# Patient Record
Sex: FEMALE | Race: WHITE | NOT HISPANIC OR LATINO | Employment: OTHER | URBAN - METROPOLITAN AREA
[De-identification: names, ages, dates, MRNs, and addresses within clinical notes are randomized per-mention and may not be internally consistent; named-entity substitution may affect disease eponyms.]

---

## 2017-01-18 ENCOUNTER — HISTORICAL (OUTPATIENT)
Dept: ENDOSCOPY | Facility: HOSPITAL | Age: 37
End: 2017-01-18

## 2017-01-27 ENCOUNTER — HISTORICAL (OUTPATIENT)
Dept: ADMINISTRATIVE | Facility: HOSPITAL | Age: 37
End: 2017-01-27

## 2017-02-15 ENCOUNTER — HISTORICAL (OUTPATIENT)
Dept: ADMINISTRATIVE | Facility: HOSPITAL | Age: 37
End: 2017-02-15

## 2017-03-03 ENCOUNTER — HISTORICAL (OUTPATIENT)
Dept: ADMINISTRATIVE | Facility: HOSPITAL | Age: 37
End: 2017-03-03

## 2017-03-27 ENCOUNTER — HISTORICAL (OUTPATIENT)
Dept: ADMINISTRATIVE | Facility: HOSPITAL | Age: 37
End: 2017-03-27

## 2017-04-27 ENCOUNTER — HISTORICAL (OUTPATIENT)
Dept: ADMINISTRATIVE | Facility: HOSPITAL | Age: 37
End: 2017-04-27

## 2017-05-03 ENCOUNTER — HISTORICAL (OUTPATIENT)
Dept: ADMINISTRATIVE | Facility: HOSPITAL | Age: 37
End: 2017-05-03

## 2017-05-03 LAB — DEPRECATED CALCIDIOL+CALCIFEROL SERPL-MC: 35.8 NG/ML (ref 30–80)

## 2017-06-07 ENCOUNTER — HISTORICAL (OUTPATIENT)
Dept: ADMINISTRATIVE | Facility: HOSPITAL | Age: 37
End: 2017-06-07

## 2017-06-07 LAB
ALBUMIN SERPL-MCNC: 3.5 GM/DL (ref 3.4–5)
ALP SERPL-CCNC: 185 UNIT/L (ref 38–126)
ALT SERPL-CCNC: 101 UNIT/L (ref 12–78)
AST SERPL-CCNC: 44 UNIT/L (ref 15–37)
BILIRUB SERPL-MCNC: 0.4 MG/DL (ref 0.2–1)
BILIRUBIN DIRECT+TOT PNL SERPL-MCNC: 0.1 MG/DL (ref 0–0.5)
BILIRUBIN DIRECT+TOT PNL SERPL-MCNC: 0.3 MG/DL (ref 0–0.8)
LIVER PROFILE INTERP: ABNORMAL
PROT SERPL-MCNC: 6.8 GM/DL (ref 6.4–8.2)

## 2017-07-19 ENCOUNTER — HISTORICAL (OUTPATIENT)
Dept: ADMINISTRATIVE | Facility: HOSPITAL | Age: 37
End: 2017-07-19

## 2017-07-19 LAB
ALBUMIN SERPL-MCNC: 3.5 GM/DL (ref 3.4–5)
ALP SERPL-CCNC: 170 UNIT/L (ref 38–126)
ALT SERPL-CCNC: 73 UNIT/L (ref 12–78)
AST SERPL-CCNC: 39 UNIT/L (ref 15–37)
BILIRUB SERPL-MCNC: 0.2 MG/DL (ref 0.2–1)
BILIRUBIN DIRECT+TOT PNL SERPL-MCNC: 0.1 MG/DL (ref 0–0.5)
BILIRUBIN DIRECT+TOT PNL SERPL-MCNC: 0.1 MG/DL (ref 0–0.8)
LIVER PROFILE INTERP: ABNORMAL
PROT SERPL-MCNC: 6.9 GM/DL (ref 6.4–8.2)

## 2017-08-08 ENCOUNTER — HISTORICAL (OUTPATIENT)
Dept: ADMINISTRATIVE | Facility: HOSPITAL | Age: 37
End: 2017-08-08

## 2017-08-08 LAB
ABS NEUT (OLG): 3.69 X10(3)/MCL (ref 2.1–9.2)
ALBUMIN SERPL-MCNC: 3.5 GM/DL (ref 3.4–5)
ALBUMIN/GLOB SERPL: 1.1 RATIO (ref 1.1–2)
ALP SERPL-CCNC: 185 UNIT/L (ref 38–126)
ALT SERPL-CCNC: 99 UNIT/L (ref 12–78)
AST SERPL-CCNC: 47 UNIT/L (ref 15–37)
BASOPHILS # BLD AUTO: 0.1 X10(3)/MCL (ref 0–0.2)
BASOPHILS NFR BLD AUTO: 1 %
BILIRUB SERPL-MCNC: 0.2 MG/DL (ref 0.2–1)
BILIRUBIN DIRECT+TOT PNL SERPL-MCNC: 0.1 MG/DL (ref 0–0.5)
BILIRUBIN DIRECT+TOT PNL SERPL-MCNC: 0.1 MG/DL (ref 0–0.8)
BUN SERPL-MCNC: 13 MG/DL (ref 7–18)
CALCIUM SERPL-MCNC: 9.4 MG/DL (ref 8.5–10.1)
CHLORIDE SERPL-SCNC: 105 MMOL/L (ref 98–107)
CO2 SERPL-SCNC: 26 MMOL/L (ref 21–32)
CREAT SERPL-MCNC: 0.61 MG/DL (ref 0.55–1.02)
EOSINOPHIL # BLD AUTO: 0.4 X10(3)/MCL (ref 0–0.9)
EOSINOPHIL NFR BLD AUTO: 4 %
ERYTHROCYTE [DISTWIDTH] IN BLOOD BY AUTOMATED COUNT: 13.2 % (ref 11.5–17)
EST. AVERAGE GLUCOSE BLD GHB EST-MCNC: 97 MG/DL
GLOBULIN SER-MCNC: 3.2 GM/DL (ref 2.4–3.5)
GLUCOSE SERPL-MCNC: 99 MG/DL (ref 74–106)
HBA1C MFR BLD: 5 % (ref 4.2–6.3)
HCT VFR BLD AUTO: 39.2 % (ref 37–47)
HGB BLD-MCNC: 12.4 GM/DL (ref 12–16)
LYMPHOCYTES # BLD AUTO: 3.5 X10(3)/MCL (ref 0.6–4.6)
LYMPHOCYTES NFR BLD AUTO: 40 %
MCH RBC QN AUTO: 31.4 PG (ref 27–31)
MCHC RBC AUTO-ENTMCNC: 31.6 GM/DL (ref 33–36)
MCV RBC AUTO: 99.2 FL (ref 80–94)
MONOCYTES # BLD AUTO: 1.1 X10(3)/MCL (ref 0.1–1.3)
MONOCYTES NFR BLD AUTO: 13 %
NEUTROPHILS # BLD AUTO: 3.69 X10(3)/MCL (ref 2.1–9.2)
NEUTROPHILS NFR BLD AUTO: 42 %
PLATELET # BLD AUTO: 324 X10(3)/MCL (ref 130–400)
PMV BLD AUTO: 11.1 FL (ref 9.4–12.4)
POTASSIUM SERPL-SCNC: 4.4 MMOL/L (ref 3.5–5.1)
PROT SERPL-MCNC: 6.7 GM/DL (ref 6.4–8.2)
RBC # BLD AUTO: 3.95 X10(6)/MCL (ref 4.2–5.4)
SODIUM SERPL-SCNC: 142 MMOL/L (ref 136–145)
WBC # SPEC AUTO: 8.7 X10(3)/MCL (ref 4.5–11.5)

## 2017-09-08 ENCOUNTER — HISTORICAL (OUTPATIENT)
Dept: ADMINISTRATIVE | Facility: HOSPITAL | Age: 37
End: 2017-09-08

## 2017-09-08 LAB
ALBUMIN SERPL-MCNC: 3.4 GM/DL (ref 3.4–5)
ALP SERPL-CCNC: 162 UNIT/L (ref 38–126)
ALT SERPL-CCNC: 48 UNIT/L (ref 12–78)
AST SERPL-CCNC: 20 UNIT/L (ref 15–37)
BILIRUB SERPL-MCNC: 0.2 MG/DL (ref 0.2–1)
BILIRUBIN DIRECT+TOT PNL SERPL-MCNC: 0.1 MG/DL (ref 0–0.2)
BILIRUBIN DIRECT+TOT PNL SERPL-MCNC: 0.1 MG/DL (ref 0–0.8)
LIVER PROFILE INTERP: ABNORMAL
PROT SERPL-MCNC: 6.7 GM/DL (ref 6.4–8.2)

## 2018-02-09 ENCOUNTER — HISTORICAL (OUTPATIENT)
Dept: ADMINISTRATIVE | Facility: HOSPITAL | Age: 38
End: 2018-02-09

## 2018-02-09 LAB
ALBUMIN SERPL-MCNC: 3.4 GM/DL (ref 3.4–5)
ALBUMIN/GLOB SERPL: 0.9 {RATIO}
ALP SERPL-CCNC: 147 UNIT/L (ref 38–126)
ALT SERPL-CCNC: 53 UNIT/L (ref 12–78)
AST SERPL-CCNC: 27 UNIT/L (ref 15–37)
BILIRUB SERPL-MCNC: 0.2 MG/DL (ref 0.2–1)
BILIRUBIN DIRECT+TOT PNL SERPL-MCNC: 0.1 MG/DL (ref 0–0.2)
BILIRUBIN DIRECT+TOT PNL SERPL-MCNC: 0.1 MG/DL (ref 0–0.8)
BUN SERPL-MCNC: 19 MG/DL (ref 7–18)
CALCIUM SERPL-MCNC: 8.8 MG/DL (ref 8.5–10.1)
CHLORIDE SERPL-SCNC: 105 MMOL/L (ref 98–107)
CO2 SERPL-SCNC: 29 MMOL/L (ref 21–32)
CREAT SERPL-MCNC: 0.63 MG/DL (ref 0.55–1.02)
ERYTHROCYTE [DISTWIDTH] IN BLOOD BY AUTOMATED COUNT: 13 % (ref 11.5–17)
EST. AVERAGE GLUCOSE BLD GHB EST-MCNC: 80 MG/DL
GLOBULIN SER-MCNC: 3.6 GM/DL (ref 2.4–3.5)
GLUCOSE SERPL-MCNC: 89 MG/DL (ref 74–106)
HBA1C MFR BLD: 4.4 % (ref 4.2–6.3)
HCT VFR BLD AUTO: 36.9 % (ref 37–47)
HGB BLD-MCNC: 12.1 GM/DL (ref 12–16)
MCH RBC QN AUTO: 33.2 PG (ref 27–31)
MCHC RBC AUTO-ENTMCNC: 32.8 GM/DL (ref 33–36)
MCV RBC AUTO: 101.1 FL (ref 80–94)
PLATELET # BLD AUTO: 315 X10(3)/MCL (ref 130–400)
PMV BLD AUTO: 11.2 FL (ref 9.4–12.4)
POTASSIUM SERPL-SCNC: 4.5 MMOL/L (ref 3.5–5.1)
PROT SERPL-MCNC: 7 GM/DL (ref 6.4–8.2)
RBC # BLD AUTO: 3.65 X10(6)/MCL (ref 4.2–5.4)
SODIUM SERPL-SCNC: 141 MMOL/L (ref 136–145)
TSH SERPL-ACNC: 1 MIU/ML (ref 0.36–3.74)
WBC # SPEC AUTO: 7 X10(3)/MCL (ref 4.5–11.5)

## 2018-08-10 ENCOUNTER — HISTORICAL (OUTPATIENT)
Dept: ADMINISTRATIVE | Facility: HOSPITAL | Age: 38
End: 2018-08-10

## 2018-08-10 LAB
ABS NEUT (OLG): 2.63 X10(3)/MCL (ref 2.1–9.2)
ALBUMIN SERPL-MCNC: 3.5 GM/DL (ref 3.4–5)
ALBUMIN/GLOB SERPL: 1 RATIO (ref 1.1–2)
ALP SERPL-CCNC: 122 UNIT/L (ref 38–126)
ALT SERPL-CCNC: 57 UNIT/L (ref 12–78)
AST SERPL-CCNC: 34 UNIT/L (ref 15–37)
BASOPHILS # BLD AUTO: 0.1 X10(3)/MCL (ref 0–0.2)
BASOPHILS NFR BLD AUTO: 1 %
BILIRUB SERPL-MCNC: 0.1 MG/DL (ref 0.2–1)
BILIRUBIN DIRECT+TOT PNL SERPL-MCNC: 0 MG/DL (ref 0–0.5)
BILIRUBIN DIRECT+TOT PNL SERPL-MCNC: 0.1 MG/DL (ref 0–0.8)
BUN SERPL-MCNC: 18 MG/DL (ref 7–18)
CALCIUM SERPL-MCNC: 9.2 MG/DL (ref 8.5–10.1)
CHLORIDE SERPL-SCNC: 105 MMOL/L (ref 98–107)
CHOLEST SERPL-MCNC: 162 MG/DL (ref 0–200)
CHOLEST/HDLC SERPL: 2.4 {RATIO} (ref 0–4)
CO2 SERPL-SCNC: 27 MMOL/L (ref 21–32)
CREAT SERPL-MCNC: 0.55 MG/DL (ref 0.55–1.02)
EOSINOPHIL # BLD AUTO: 0.2 X10(3)/MCL (ref 0–0.9)
EOSINOPHIL NFR BLD AUTO: 3 %
ERYTHROCYTE [DISTWIDTH] IN BLOOD BY AUTOMATED COUNT: 12.9 % (ref 11.5–17)
EST. AVERAGE GLUCOSE BLD GHB EST-MCNC: 97 MG/DL
GLOBULIN SER-MCNC: 3.5 GM/DL (ref 2.4–3.5)
GLUCOSE SERPL-MCNC: 106 MG/DL (ref 74–106)
HBA1C MFR BLD: 5 % (ref 4.2–6.3)
HCT VFR BLD AUTO: 39.7 % (ref 37–47)
HDLC SERPL-MCNC: 68 MG/DL (ref 35–60)
HGB BLD-MCNC: 12.8 GM/DL (ref 12–16)
LDLC SERPL CALC-MCNC: 78 MG/DL (ref 0–129)
LYMPHOCYTES # BLD AUTO: 3.6 X10(3)/MCL (ref 0.6–4.6)
LYMPHOCYTES NFR BLD AUTO: 48 %
MCH RBC QN AUTO: 32.9 PG (ref 27–31)
MCHC RBC AUTO-ENTMCNC: 32.2 GM/DL (ref 33–36)
MCV RBC AUTO: 102.1 FL (ref 80–94)
MONOCYTES # BLD AUTO: 0.9 X10(3)/MCL (ref 0.1–1.3)
MONOCYTES NFR BLD AUTO: 12 %
NEUTROPHILS # BLD AUTO: 2.63 X10(3)/MCL (ref 1.4–7.9)
NEUTROPHILS NFR BLD AUTO: 35 %
PLATELET # BLD AUTO: 348 X10(3)/MCL (ref 130–400)
PMV BLD AUTO: 11.3 FL (ref 9.4–12.4)
POTASSIUM SERPL-SCNC: 4.3 MMOL/L (ref 3.5–5.1)
PROT SERPL-MCNC: 7 GM/DL (ref 6.4–8.2)
RBC # BLD AUTO: 3.89 X10(6)/MCL (ref 4.2–5.4)
SODIUM SERPL-SCNC: 140 MMOL/L (ref 136–145)
TRIGL SERPL-MCNC: 80 MG/DL (ref 30–150)
VLDLC SERPL CALC-MCNC: 16 MG/DL
WBC # SPEC AUTO: 7.5 X10(3)/MCL (ref 4.5–11.5)

## 2021-02-04 ENCOUNTER — HISTORICAL (OUTPATIENT)
Dept: ADMINISTRATIVE | Facility: HOSPITAL | Age: 41
End: 2021-02-04

## 2021-02-04 LAB
ABS NEUT (OLG): 3.46 X10(3)/MCL (ref 2.1–9.2)
ALBUMIN SERPL-MCNC: 3.7 GM/DL (ref 3.5–5)
ALBUMIN/GLOB SERPL: 1.2 RATIO (ref 1.1–2)
ALP SERPL-CCNC: 128 UNIT/L (ref 40–150)
ALT SERPL-CCNC: 37 UNIT/L (ref 0–55)
AST SERPL-CCNC: 22 UNIT/L (ref 5–34)
BASOPHILS # BLD AUTO: 0.07 X10(3)/MCL (ref 0–0.2)
BASOPHILS NFR BLD AUTO: 0.9 % (ref 0–1)
BILIRUB SERPL-MCNC: 0.3 MG/DL (ref 0.2–1.2)
BILIRUBIN DIRECT+TOT PNL SERPL-MCNC: 0.1 MG/DL (ref 0–0.5)
BILIRUBIN DIRECT+TOT PNL SERPL-MCNC: 0.2 MG/DL (ref 0–0.8)
BUN SERPL-MCNC: 15.8 MG/DL (ref 7–18.7)
CALCIUM SERPL-MCNC: 9.3 MG/DL (ref 8.4–10.2)
CHLORIDE SERPL-SCNC: 105 MMOL/L (ref 98–107)
CHOLEST SERPL-MCNC: 173 MG/DL
CHOLEST/HDLC SERPL: 4 {RATIO} (ref 0–5)
CO2 SERPL-SCNC: 26 MMOL/L (ref 22–29)
CREAT SERPL-MCNC: 0.71 MG/DL (ref 0.57–1.11)
EOSINOPHIL # BLD AUTO: 0.17 X10(3)/MCL (ref 0–0.9)
EOSINOPHIL NFR BLD AUTO: 2.1 % (ref 0–6.4)
ERYTHROCYTE [DISTWIDTH] IN BLOOD BY AUTOMATED COUNT: 12.8 % (ref 11.5–17)
EST. AVERAGE GLUCOSE BLD GHB EST-MCNC: <96.8 MG/DL
GLOBULIN SER-MCNC: 3.1 GM/DL (ref 2.4–3.5)
GLUCOSE SERPL-MCNC: 108 MG/DL (ref 74–100)
HBA1C MFR BLD: <5 %
HCT VFR BLD AUTO: 39.7 % (ref 37–47)
HDLC SERPL-MCNC: 45 MG/DL (ref 40–60)
HGB BLD-MCNC: 13 GM/DL (ref 12–16)
IMM GRANULOCYTES # BLD AUTO: 0.02 10*3/UL (ref 0–0.02)
IMM GRANULOCYTES NFR BLD AUTO: 0.3 % (ref 0–0.43)
LDLC SERPL CALC-MCNC: 105 MG/DL (ref 50–140)
LYMPHOCYTES # BLD AUTO: 3.3 X10(3)/MCL (ref 0.6–4.6)
LYMPHOCYTES NFR BLD AUTO: 41.5 % (ref 16–44)
MCH RBC QN AUTO: 31.9 PG (ref 27–31)
MCHC RBC AUTO-ENTMCNC: 32.7 GM/DL (ref 33–36)
MCV RBC AUTO: 97.3 FL (ref 80–94)
MONOCYTES # BLD AUTO: 0.93 X10(3)/MCL (ref 0.1–1.3)
MONOCYTES NFR BLD AUTO: 11.7 % (ref 4–12.1)
NEUTROPHILS # BLD AUTO: 3.46 X10(3)/MCL (ref 2.1–9.2)
NEUTROPHILS NFR BLD AUTO: 43.5 % (ref 43–73)
NRBC BLD AUTO-RTO: 0 % (ref 0–0.2)
PLATELET # BLD AUTO: 375 X10(3)/MCL (ref 130–400)
PMV BLD AUTO: 11.3 FL (ref 7.4–10.4)
POTASSIUM SERPL-SCNC: 4.2 MMOL/L (ref 3.5–5.1)
PROT SERPL-MCNC: 6.8 GM/DL (ref 6.4–8.3)
RBC # BLD AUTO: 4.08 X10(6)/MCL (ref 4.2–5.4)
SODIUM SERPL-SCNC: 140 MMOL/L (ref 136–145)
TRIGL SERPL-MCNC: 115 MG/DL (ref 0–150)
TSH SERPL-ACNC: 0.91 UIU/ML (ref 0.35–4.94)
VLDLC SERPL CALC-MCNC: 23 MG/DL
WBC # SPEC AUTO: 8 X10(3)/MCL (ref 4.5–11.5)

## 2021-09-21 ENCOUNTER — HISTORICAL (OUTPATIENT)
Dept: ADMINISTRATIVE | Facility: HOSPITAL | Age: 41
End: 2021-09-21

## 2021-09-21 LAB
ABS NEUT (OLG): 3.75 X10(3)/MCL (ref 2.1–9.2)
ALBUMIN SERPL-MCNC: 3.5 GM/DL (ref 3.5–5)
ALBUMIN/GLOB SERPL: 1.1 RATIO (ref 1.1–2)
ALP SERPL-CCNC: 124 UNIT/L (ref 40–150)
ALT SERPL-CCNC: 39 UNIT/L (ref 0–55)
AST SERPL-CCNC: 23 UNIT/L (ref 5–34)
BASOPHILS # BLD AUTO: 0.08 X10(3)/MCL (ref 0–0.2)
BASOPHILS NFR BLD AUTO: 1 % (ref 0–1)
BILIRUB SERPL-MCNC: 0.3 MG/DL (ref 0.2–1.2)
BILIRUBIN DIRECT+TOT PNL SERPL-MCNC: 0.1 MG/DL (ref 0–0.5)
BILIRUBIN DIRECT+TOT PNL SERPL-MCNC: 0.2 MG/DL (ref 0–0.8)
BUN SERPL-MCNC: 15.1 MG/DL (ref 7–18.7)
CALCIUM SERPL-MCNC: 9.4 MG/DL (ref 8.4–10.2)
CHLORIDE SERPL-SCNC: 106 MMOL/L (ref 98–107)
CHOLEST SERPL-MCNC: 160 MG/DL
CHOLEST/HDLC SERPL: 4 {RATIO} (ref 0–5)
CO2 SERPL-SCNC: 23 MMOL/L (ref 22–29)
CREAT SERPL-MCNC: 0.67 MG/DL (ref 0.57–1.11)
EOSINOPHIL # BLD AUTO: 0.31 X10(3)/MCL (ref 0–0.9)
EOSINOPHIL NFR BLD AUTO: 3.9 % (ref 0–6.4)
ERYTHROCYTE [DISTWIDTH] IN BLOOD BY AUTOMATED COUNT: 13 % (ref 11.5–17)
EST. AVERAGE GLUCOSE BLD GHB EST-MCNC: <96.8 MG/DL
GLOBULIN SER-MCNC: 3.2 GM/DL (ref 2.4–3.5)
GLUCOSE SERPL-MCNC: 123 MG/DL (ref 74–100)
HBA1C MFR BLD: <5 %
HCT VFR BLD AUTO: 42 % (ref 37–47)
HDLC SERPL-MCNC: 42 MG/DL (ref 40–60)
HGB BLD-MCNC: 13.9 GM/DL (ref 12–16)
IMM GRANULOCYTES # BLD AUTO: 0.02 10*3/UL (ref 0–0.02)
IMM GRANULOCYTES NFR BLD AUTO: 0.2 % (ref 0–0.43)
LDLC SERPL CALC-MCNC: 88 MG/DL (ref 50–140)
LYMPHOCYTES # BLD AUTO: 2.78 X10(3)/MCL (ref 0.6–4.6)
LYMPHOCYTES NFR BLD AUTO: 34.7 % (ref 16–44)
MCH RBC QN AUTO: 31.9 PG (ref 27–31)
MCHC RBC AUTO-ENTMCNC: 33.1 GM/DL (ref 33–36)
MCV RBC AUTO: 96.3 FL (ref 80–94)
MONOCYTES # BLD AUTO: 1.07 X10(3)/MCL (ref 0.1–1.3)
MONOCYTES NFR BLD AUTO: 13.4 % (ref 4–12.1)
NEUTROPHILS # BLD AUTO: 3.75 X10(3)/MCL (ref 2.1–9.2)
NEUTROPHILS NFR BLD AUTO: 46.8 % (ref 43–73)
NRBC BLD AUTO-RTO: 0 % (ref 0–0.2)
PLATELET # BLD AUTO: 347 X10(3)/MCL (ref 130–400)
PMV BLD AUTO: 10.9 FL (ref 7.4–10.4)
POTASSIUM SERPL-SCNC: 4.3 MMOL/L (ref 3.5–5.1)
PROT SERPL-MCNC: 6.7 GM/DL (ref 6.4–8.3)
RBC # BLD AUTO: 4.36 X10(6)/MCL (ref 4.2–5.4)
SODIUM SERPL-SCNC: 139 MMOL/L (ref 136–145)
TRIGL SERPL-MCNC: 149 MG/DL (ref 0–150)
VLDLC SERPL CALC-MCNC: 30 MG/DL
WBC # SPEC AUTO: 8 X10(3)/MCL (ref 4.5–11.5)

## 2022-02-03 ENCOUNTER — HISTORICAL (OUTPATIENT)
Dept: ADMINISTRATIVE | Facility: HOSPITAL | Age: 42
End: 2022-02-03

## 2022-02-03 LAB
ABS NEUT (OLG): 3.78 (ref 2.1–9.2)
ALBUMIN SERPL-MCNC: 3.5 G/DL (ref 3.5–5)
ALBUMIN/GLOB SERPL: 1.2 {RATIO} (ref 1.1–2)
ALP SERPL-CCNC: 112 U/L (ref 40–150)
ALT SERPL-CCNC: 30 U/L (ref 0–55)
AST SERPL-CCNC: 22 U/L (ref 5–34)
BASOPHILS # BLD AUTO: 0.08 10*3/UL (ref 0–0.2)
BASOPHILS NFR BLD AUTO: 1 % (ref 0–1)
BILIRUB SERPL-MCNC: 0.4 MG/DL (ref 0.2–1.2)
BILIRUBIN DIRECT+TOT PNL SERPL-MCNC: 0.2 (ref 0–0.5)
BILIRUBIN DIRECT+TOT PNL SERPL-MCNC: 0.2 (ref 0–0.8)
BUN SERPL-MCNC: 16.1 MG/DL (ref 7–18.7)
CALCIUM SERPL-MCNC: 9 MG/DL (ref 8.4–10.2)
CHLORIDE SERPL-SCNC: 108 MMOL/L (ref 98–107)
CHOLEST SERPL-MCNC: 154 MG/DL
CHOLEST/HDLC SERPL: 4 {RATIO} (ref 0–5)
CO2 SERPL-SCNC: 23 MMOL/L (ref 22–29)
CREAT SERPL-MCNC: 0.63 MG/DL (ref 0.57–1.11)
EOSINOPHIL # BLD AUTO: 0.29 10*3/UL (ref 0–0.9)
EOSINOPHIL NFR BLD AUTO: 3.7 % (ref 0–6.4)
ERYTHROCYTE [DISTWIDTH] IN BLOOD BY AUTOMATED COUNT: 13.5 % (ref 11.5–17)
EST. AVERAGE GLUCOSE BLD GHB EST-MCNC: <96.8 MG/DL
GLOBULIN SER-MCNC: 3 G/DL (ref 2.4–3.5)
GLUCOSE SERPL-MCNC: 120 MG/DL (ref 74–100)
HBA1C MFR BLD: <5 %
HCT VFR BLD AUTO: 39.6 % (ref 37–47)
HDLC SERPL-MCNC: 42 MG/DL (ref 40–60)
HEMOLYSIS INTERF INDEX SERPL-ACNC: 16
HGB BLD-MCNC: 12.9 G/DL (ref 12–16)
ICTERIC INTERF INDEX SERPL-ACNC: 1
IMM GRANULOCYTES # BLD AUTO: 0.02 10*3/UL (ref 0–0.02)
IMM GRANULOCYTES NFR BLD AUTO: 0.3 % (ref 0–0.43)
LDLC SERPL CALC-MCNC: 92 MG/DL (ref 50–140)
LIPEMIC INTERF INDEX SERPL-ACNC: 2
LYMPHOCYTES # BLD AUTO: 2.76 10*3/UL (ref 0.6–4.6)
LYMPHOCYTES NFR BLD AUTO: 34.9 % (ref 16–44)
MANUAL DIFF? (OHS): NO
MCH RBC QN AUTO: 31.2 PG (ref 27–31)
MCHC RBC AUTO-ENTMCNC: 32.6 G/DL (ref 33–36)
MCV RBC AUTO: 95.9 FL (ref 80–94)
MONOCYTES # BLD AUTO: 0.97 10*3/UL (ref 0.1–1.3)
MONOCYTES NFR BLD AUTO: 12.3 % (ref 4–12.1)
NEUTROPHILS # BLD AUTO: 3.78 10*3/UL (ref 2.1–9.2)
NEUTROPHILS NFR BLD AUTO: 47.8 % (ref 43–73)
NRBC BLD AUTO-RTO: 0 % (ref 0–0.2)
PLATELET # BLD AUTO: 314 10*3/UL (ref 130–400)
PMV BLD AUTO: 11.4 FL (ref 7.4–10.4)
POTASSIUM SERPL-SCNC: 3.9 MMOL/L (ref 3.5–5.1)
PROT SERPL-MCNC: 6.5 G/DL (ref 6.4–8.3)
RBC # BLD AUTO: 4.13 10*6/UL (ref 4.2–5.4)
SODIUM SERPL-SCNC: 143 MMOL/L (ref 136–145)
TRIGL SERPL-MCNC: 101 MG/DL (ref 0–150)
TSH SERPL-ACNC: 0.87 M[IU]/L (ref 0.35–4.94)
VLDLC SERPL CALC-MCNC: 20 MG/DL
WBC # SPEC AUTO: 7.9 10*3/UL (ref 4.5–11.5)

## 2022-04-21 ENCOUNTER — HISTORICAL (OUTPATIENT)
Dept: ADMINISTRATIVE | Facility: HOSPITAL | Age: 42
End: 2022-04-21
Payer: MEDICARE

## 2022-08-03 ENCOUNTER — LAB REQUISITION (OUTPATIENT)
Dept: LAB | Facility: HOSPITAL | Age: 42
End: 2022-08-03
Payer: MEDICARE

## 2022-08-03 DIAGNOSIS — I48.20 CHRONIC ATRIAL FIBRILLATION, UNSPECIFIED: ICD-10-CM

## 2022-08-03 DIAGNOSIS — I10 ESSENTIAL (PRIMARY) HYPERTENSION: ICD-10-CM

## 2022-08-03 LAB
ALBUMIN SERPL-MCNC: 3.6 GM/DL (ref 3.5–5)
ALBUMIN/GLOB SERPL: 1.2 RATIO (ref 1.1–2)
ALP SERPL-CCNC: 125 UNIT/L (ref 40–150)
ALT SERPL-CCNC: 48 UNIT/L (ref 0–55)
AST SERPL-CCNC: 31 UNIT/L (ref 5–34)
BASOPHILS # BLD AUTO: 0.07 X10(3)/MCL (ref 0–0.2)
BASOPHILS NFR BLD AUTO: 0.9 %
BILIRUBIN DIRECT+TOT PNL SERPL-MCNC: 0.3 MG/DL
BUN SERPL-MCNC: 14.7 MG/DL (ref 7–18.7)
CALCIUM SERPL-MCNC: 9.4 MG/DL (ref 8.4–10.2)
CHLORIDE SERPL-SCNC: 104 MMOL/L (ref 98–107)
CHOLEST SERPL-MCNC: 176 MG/DL
CHOLEST/HDLC SERPL: 4 {RATIO} (ref 0–5)
CO2 SERPL-SCNC: 25 MMOL/L (ref 22–29)
CREAT SERPL-MCNC: 0.59 MG/DL (ref 0.55–1.02)
EOSINOPHIL # BLD AUTO: 0.22 X10(3)/MCL (ref 0–0.9)
EOSINOPHIL NFR BLD AUTO: 2.9 %
ERYTHROCYTE [DISTWIDTH] IN BLOOD BY AUTOMATED COUNT: 13.1 % (ref 11.5–17)
EST. AVERAGE GLUCOSE BLD GHB EST-MCNC: 93.9 MG/DL
GLOBULIN SER-MCNC: 3.1 GM/DL (ref 2.4–3.5)
GLUCOSE SERPL-MCNC: 92 MG/DL (ref 74–100)
HBA1C MFR BLD: 4.9 %
HCT VFR BLD AUTO: 38.7 % (ref 37–47)
HDLC SERPL-MCNC: 44 MG/DL (ref 35–60)
HGB BLD-MCNC: 13.2 GM/DL (ref 12–16)
IMM GRANULOCYTES # BLD AUTO: 0.02 X10(3)/MCL (ref 0–0.04)
IMM GRANULOCYTES NFR BLD AUTO: 0.3 %
LDLC SERPL CALC-MCNC: 103 MG/DL (ref 50–140)
LYMPHOCYTES # BLD AUTO: 3.41 X10(3)/MCL (ref 0.6–4.6)
LYMPHOCYTES NFR BLD AUTO: 45.2 %
MCH RBC QN AUTO: 32.3 PG (ref 27–31)
MCHC RBC AUTO-ENTMCNC: 34.1 MG/DL (ref 33–36)
MCV RBC AUTO: 94.6 FL (ref 80–94)
MONOCYTES # BLD AUTO: 0.84 X10(3)/MCL (ref 0.1–1.3)
MONOCYTES NFR BLD AUTO: 11.1 %
NEUTROPHILS # BLD AUTO: 3 X10(3)/MCL (ref 2.1–9.2)
NEUTROPHILS NFR BLD AUTO: 39.6 %
NRBC BLD AUTO-RTO: 0 %
PLATELET # BLD AUTO: 361 X10(3)/MCL (ref 130–400)
PMV BLD AUTO: 11.4 FL (ref 7.4–10.4)
POTASSIUM SERPL-SCNC: 4.2 MMOL/L (ref 3.5–5.1)
PROT SERPL-MCNC: 6.7 GM/DL (ref 6.4–8.3)
RBC # BLD AUTO: 4.09 X10(6)/MCL (ref 4.2–5.4)
SODIUM SERPL-SCNC: 143 MMOL/L (ref 136–145)
TRIGL SERPL-MCNC: 145 MG/DL (ref 37–140)
VLDLC SERPL CALC-MCNC: 29 MG/DL
WBC # SPEC AUTO: 7.6 X10(3)/MCL (ref 4.5–11.5)

## 2022-08-03 PROCEDURE — 80061 LIPID PANEL: CPT | Performed by: INTERNAL MEDICINE

## 2022-08-03 PROCEDURE — 80053 COMPREHEN METABOLIC PANEL: CPT | Performed by: INTERNAL MEDICINE

## 2022-08-03 PROCEDURE — 83036 HEMOGLOBIN GLYCOSYLATED A1C: CPT | Performed by: INTERNAL MEDICINE

## 2022-08-03 PROCEDURE — 85025 COMPLETE CBC W/AUTO DIFF WBC: CPT | Performed by: INTERNAL MEDICINE

## 2022-09-30 ENCOUNTER — HOSPITAL ENCOUNTER (EMERGENCY)
Facility: HOSPITAL | Age: 42
Discharge: HOME | End: 2022-09-30
Attending: STUDENT IN AN ORGANIZED HEALTH CARE EDUCATION/TRAINING PROGRAM
Payer: MEDICARE

## 2022-09-30 VITALS
OXYGEN SATURATION: 99 % | TEMPERATURE: 98 F | RESPIRATION RATE: 18 BRPM | DIASTOLIC BLOOD PRESSURE: 88 MMHG | HEART RATE: 84 BPM | SYSTOLIC BLOOD PRESSURE: 129 MMHG

## 2022-09-30 DIAGNOSIS — K94.23 PEG TUBE MALFUNCTION: Primary | ICD-10-CM

## 2022-09-30 PROCEDURE — 99284 EMERGENCY DEPT VISIT MOD MDM: CPT | Mod: 25

## 2022-09-30 PROCEDURE — 43762 RPLC GTUBE NO REVJ TRC: CPT

## 2022-09-30 NOTE — ED PROVIDER NOTES
Encounter Date: 9/30/2022       History     Chief Complaint   Patient presents with    peg tube      Pt. Pulled peg tube out x1 hour ago.. denies trauma.. here for replacement      41-year-old female sent from nursing home after pulling out PEG tube 1 hour ago.  No erythema or drainage noted from site.  Denies any trauma.  Sent here for PEG replacement.  Patient is nonverbal.    The history is provided by the nursing home and the EMS personnel. The history is limited by the condition of the patient.   Review of patient's allergies indicates:  No Known Allergies  No past medical history on file.  No past surgical history on file.  No family history on file.     Review of Systems   Unable to perform ROS: Patient nonverbal     Physical Exam     Initial Vitals [09/30/22 1718]   BP Pulse Resp Temp SpO2   129/88 84 18 98.1 °F (36.7 °C) 99 %      MAP       --         Physical Exam    Vitals reviewed.  Constitutional: She appears well-developed.   HENT:   Head: Normocephalic and atraumatic.   Neck: Neck supple.   Normal range of motion.  Cardiovascular:  Normal rate, regular rhythm and normal heart sounds.           Pulmonary/Chest: Breath sounds normal. She has no wheezes. She has no rhonchi. She has no rales.   Abdominal: Abdomen is soft and flat. Bowel sounds are normal. There is no abdominal tenderness.   Stoma noted in left upper quadrant without erythema or drainage.   Musculoskeletal:         General: Normal range of motion.      Cervical back: Normal range of motion and neck supple.     Neurological: She is alert and oriented to person, place, and time.   Skin: Skin is warm and dry.   Psychiatric: She has a normal mood and affect.       ED Course   Feeding Tube    Date/Time: 9/30/2022 5:48 PM  Location procedure was performed: [facility] EMERGENCY DEPARTMENT  Performed by: CUONG Camacho  Authorized by: CUONG Camacho   Indications: tube removed by patient  Patient position: supine  Procedure type: replacement  Tube size:  18 Fr  Endoscope used: no  Bulb inflation volume: 10 (ml)  Bulb inflation fluid: normal saline  Placement/position confirmation: x-ray, contrast and gastric contents aspirated  Tube placement difficulty: minimal  Complications: No  Patient tolerance: patient tolerated the procedure well with no immediate complications      Labs Reviewed - No data to display       Imaging Results              XR Non-Rad Performed NG/Gastric Tube Check (Final result)  Result time 09/30/22 17:55:48      Final result by Rudi Wylie MD (09/30/22 17:55:48)                   Impression:      Optimal placement of the gastrostomy tube.      Electronically signed by: Rudi Wylie  Date:    09/30/2022  Time:    17:55               Narrative:    EXAMINATION:  XR NON-RADIOLOGIST PERFORMED NG/GASTRIC TUBE CHECK    CLINICAL HISTORY:  PEG tube check;    TECHNIQUE:  One    COMPARISON:  April 21, 2022    FINDINGS:  Contrast injected opacifies the stomach and portion of the duodenum.  There is no contrast extravasation.  Intestinal gas pattern is nonspecific and nonobstructive.  Colonic fecal loading throughout.                                       Medications - No data to display                           Clinical Impression:   Final diagnoses:  [K94.23] PEG tube malfunction (Primary)      ED Disposition Condition    Discharge Stable          ED Prescriptions    None       Follow-up Information       Follow up With Specialties Details Why Contact Info    PCP  In 1 week As needed              CUONG Camacho  09/30/22 6442

## 2022-10-22 ENCOUNTER — HOSPITAL ENCOUNTER (EMERGENCY)
Facility: HOSPITAL | Age: 42
Discharge: HOME | End: 2022-10-22
Attending: EMERGENCY MEDICINE
Payer: MEDICARE

## 2022-10-22 VITALS
TEMPERATURE: 98 F | RESPIRATION RATE: 16 BRPM | OXYGEN SATURATION: 98 % | HEART RATE: 80 BPM | SYSTOLIC BLOOD PRESSURE: 135 MMHG | DIASTOLIC BLOOD PRESSURE: 83 MMHG

## 2022-10-22 DIAGNOSIS — T85.528A DISLODGED GASTROSTOMY TUBE: Primary | ICD-10-CM

## 2022-10-22 PROCEDURE — 99283 EMERGENCY DEPT VISIT LOW MDM: CPT | Mod: 25

## 2022-10-22 PROCEDURE — 43762 RPLC GTUBE NO REVJ TRC: CPT

## 2022-10-22 NOTE — ED PROVIDER NOTES
Encounter Date: 10/22/2022       History     Chief Complaint   Patient presents with    medical evaluation     Pt to ER via [facility] for PEG not flushing.  Sent from NH for eval.      43 y/o F with hx spastic quadriplegia, PEG dependent, presents to the ED for evaluation of non-functioning PEG tube since this AM. NH reports unable to flush and needs AM meds. No reported vomiting, no fever.     The history is provided by the EMS personnel, the nursing home and medical records. The history is limited by the condition of the patient.   Illness   The current episode started just prior to arrival. The problem occurs occasionally. The problem has been unchanged. The pain is at a severity of 0/10. Nothing relieves the symptoms. Nothing aggravates the symptoms.   Review of patient's allergies indicates:  No Known Allergies  Past Medical History:   Diagnosis Date    DM (diabetes mellitus)     HTN (hypertension)     Quadriplegia      Past Surgical History:   Procedure Laterality Date    GASTROSTOMY TUBE PLACEMENT       History reviewed. No pertinent family history.     Review of Systems   Unable to perform ROS: Patient nonverbal     Physical Exam     Initial Vitals [10/22/22 0828]   BP Pulse Resp Temp SpO2   135/83 80 16 97.9 °F (36.6 °C) 98 %      MAP       --         Physical Exam    Constitutional: She appears well-nourished. No distress.   HENT:   Mouth/Throat: Oropharynx is clear and moist.   Eyes: Conjunctivae are normal.   Neck: Neck supple.   Cardiovascular:  Normal rate.           Pulmonary/Chest: No respiratory distress.   Abdominal: Abdomen is soft. There is no abdominal tenderness.   PEG tube in place, thick liquid lining tubing, unable to flush   Musculoskeletal:      Cervical back: Neck supple.     Neurological: She is alert.   Skin: Skin is warm and dry. No rash noted.       ED Course   Feeding Tube    Date/Time: 10/22/2022 8:30 AM  Location procedure was performed: [facility] EMERGENCY DEPARTMENT  Performed by: Azalea HARRIS  "MD Nelda  Authorized by: Azalea Lutz MD   Consent: Verbal consent obtained.  Consent given by: implied consent by NH.  Patient identity confirmed: arm band  Time out: Immediately prior to procedure a "time out" was called to verify the correct patient, procedure, equipment, support staff and site/side marked as required.  Indications: tube blocked  Preparation: Patient was prepped and draped in the usual sterile fashion.    Sedation:  Patient sedated: no    Tube type: gastrostomy  Patient position: supine  Procedure type: replacement  Tube size: 18 Fr  Endoscope used: no  Bulb inflation volume: 10 (ml)  Placement/position confirmation: x-ray and contrast  Tube placement difficulty: none  Complications: No  Specimens: No  Implants: No  Patient tolerance: patient tolerated the procedure well with no immediate complications      Labs Reviewed - No data to display       Imaging Results              XR Non-Rad Performed NG/Gastric Tube Check (In process)  Result time 10/22/22 08:39:31                  X-Rays:   Independently Interpreted Readings:   Other Readings:  Gastrostomy tube check XR: contrast within gastric lumen  Medications - No data to display  Medical Decision Making:   Initial Assessment:   Ms. Alfonso presented for PEG evaluation w/ nonfunctioning tube, unable to be flushed at NH. Will replace in ED.   Differential Diagnosis:   Occluded PEG tube  Clinical Tests:   Radiological Study: Ordered and Reviewed  ED Management:  PEG replaced w/o issue. XR w/ tube in adequate position. Will DC back to nursing home.                         Clinical Impression:   Final diagnoses:  [T85.528A] Dislodged gastrostomy tube (Primary)      ED Disposition Condition    Discharge Stable          ED Prescriptions    None       Follow-up Information       Follow up With Specialties Details Why Contact Info    Td Thompson MD Internal Medicine  As needed [address] [place] [address]  [phone]      [facility] - Emergency Dept Emergency Medicine  As needed [address] [facility] [facility] [place] [address]  [phone]             Azalea Lutz MD  10/22/22 0879

## 2023-01-21 ENCOUNTER — LAB REQUISITION (OUTPATIENT)
Dept: LAB | Facility: HOSPITAL | Age: 43
End: 2023-01-21
Payer: MEDICARE

## 2023-01-21 DIAGNOSIS — U07.1 COVID-19: ICD-10-CM

## 2023-01-21 LAB
ABS NEUT CALC (OHS): 5.76 X10(3)/MCL (ref 2.1–9.2)
ALBUMIN SERPL-MCNC: 3.3 G/DL (ref 3.5–5)
ALBUMIN/GLOB SERPL: 1 RATIO (ref 1.1–2)
ALP SERPL-CCNC: 141 UNIT/L (ref 40–150)
ALT SERPL-CCNC: 47 UNIT/L (ref 0–55)
AST SERPL-CCNC: 29 UNIT/L (ref 5–34)
BILIRUBIN DIRECT+TOT PNL SERPL-MCNC: 0.3 MG/DL
BUN SERPL-MCNC: 12 MG/DL (ref 7–18.7)
CALCIUM SERPL-MCNC: 9 MG/DL (ref 8.4–10.2)
CHLORIDE SERPL-SCNC: 105 MMOL/L (ref 98–107)
CO2 SERPL-SCNC: 24 MMOL/L (ref 22–29)
CREAT SERPL-MCNC: 0.61 MG/DL (ref 0.55–1.02)
ERYTHROCYTE [DISTWIDTH] IN BLOOD BY AUTOMATED COUNT: 13.4 % (ref 11.5–17)
GFR SERPLBLD CREATININE-BSD FMLA CKD-EPI: >60 MLS/MIN/1.73/M2
GLOBULIN SER-MCNC: 3.4 GM/DL (ref 2.4–3.5)
GLUCOSE SERPL-MCNC: 123 MG/DL (ref 74–100)
HCT VFR BLD AUTO: 41.1 % (ref 37–47)
HGB BLD-MCNC: 13.4 GM/DL (ref 12–16)
HYPOCHROMIA BLD QL SMEAR: ABNORMAL
IMM GRANULOCYTES # BLD AUTO: 0.04 X10(3)/MCL (ref 0–0.04)
IMM GRANULOCYTES NFR BLD AUTO: 0.4 %
LYMPHOCYTES NFR BLD MANUAL: 2.02 X10(3)/MCL
LYMPHOCYTES NFR BLD MANUAL: 21 % (ref 13–40)
MCH RBC QN AUTO: 31.3 PG
MCHC RBC AUTO-ENTMCNC: 32.6 MG/DL (ref 33–36)
MCV RBC AUTO: 96 FL (ref 80–94)
MONOCYTES NFR BLD MANUAL: 1.82 X10(3)/MCL (ref 0.1–1.3)
MONOCYTES NFR BLD MANUAL: 19 % (ref 2–11)
NEUTROPHILS NFR BLD MANUAL: 60 % (ref 47–80)
NRBC BLD AUTO-RTO: 0 %
PLATELET # BLD AUTO: 305 X10(3)/MCL (ref 130–400)
PLATELET # BLD EST: ADEQUATE 10*3/UL
PMV BLD AUTO: 10.8 FL (ref 7.4–10.4)
POTASSIUM SERPL-SCNC: 4.1 MMOL/L (ref 3.5–5.1)
PROT SERPL-MCNC: 6.7 GM/DL (ref 6.4–8.3)
RBC # BLD AUTO: 4.28 X10(6)/MCL (ref 4.2–5.4)
RBC MORPH BLD: ABNORMAL
SODIUM SERPL-SCNC: 141 MMOL/L (ref 136–145)
WBC # SPEC AUTO: 9.6 X10(3)/MCL (ref 4.5–11.5)

## 2023-01-21 PROCEDURE — 85027 COMPLETE CBC AUTOMATED: CPT | Performed by: INTERNAL MEDICINE

## 2023-01-21 PROCEDURE — 85025 COMPLETE CBC W/AUTO DIFF WBC: CPT | Performed by: INTERNAL MEDICINE

## 2023-01-21 PROCEDURE — 80053 COMPREHEN METABOLIC PANEL: CPT | Performed by: INTERNAL MEDICINE

## 2023-02-02 ENCOUNTER — LAB REQUISITION (OUTPATIENT)
Dept: LAB | Facility: HOSPITAL | Age: 43
End: 2023-02-02
Payer: MEDICARE

## 2023-02-02 DIAGNOSIS — I10 ESSENTIAL (PRIMARY) HYPERTENSION: ICD-10-CM

## 2023-02-02 LAB
ALBUMIN SERPL-MCNC: 3.4 G/DL (ref 3.5–5)
ALBUMIN/GLOB SERPL: 1.1 RATIO (ref 1.1–2)
ALP SERPL-CCNC: 131 UNIT/L (ref 40–150)
ALT SERPL-CCNC: 56 UNIT/L (ref 0–55)
AST SERPL-CCNC: 26 UNIT/L (ref 5–34)
BASOPHILS # BLD AUTO: 0.07 X10(3)/MCL (ref 0–0.2)
BASOPHILS NFR BLD AUTO: 0.7 %
BILIRUBIN DIRECT+TOT PNL SERPL-MCNC: 0.4 MG/DL
BUN SERPL-MCNC: 17.4 MG/DL (ref 7–18.7)
CALCIUM SERPL-MCNC: 9.1 MG/DL (ref 8.4–10.2)
CHLORIDE SERPL-SCNC: 107 MMOL/L (ref 98–107)
CHOLEST SERPL-MCNC: 154 MG/DL
CHOLEST/HDLC SERPL: 4 {RATIO} (ref 0–5)
CO2 SERPL-SCNC: 23 MMOL/L (ref 22–29)
CREAT SERPL-MCNC: 0.68 MG/DL (ref 0.55–1.02)
EOSINOPHIL # BLD AUTO: 0.37 X10(3)/MCL (ref 0–0.9)
EOSINOPHIL NFR BLD AUTO: 3.8 %
ERYTHROCYTE [DISTWIDTH] IN BLOOD BY AUTOMATED COUNT: 13.2 % (ref 11.5–17)
EST. AVERAGE GLUCOSE BLD GHB EST-MCNC: 96.8 MG/DL
GFR SERPLBLD CREATININE-BSD FMLA CKD-EPI: >60 MLS/MIN/1.73/M2
GLOBULIN SER-MCNC: 3.1 GM/DL (ref 2.4–3.5)
GLUCOSE SERPL-MCNC: 103 MG/DL (ref 74–100)
HBA1C MFR BLD: 5 %
HCT VFR BLD AUTO: 39.1 % (ref 37–47)
HDLC SERPL-MCNC: 38 MG/DL (ref 35–60)
HGB BLD-MCNC: 12.9 GM/DL (ref 12–16)
IMM GRANULOCYTES # BLD AUTO: 0.05 X10(3)/MCL (ref 0–0.04)
IMM GRANULOCYTES NFR BLD AUTO: 0.5 %
LDLC SERPL CALC-MCNC: 86 MG/DL (ref 50–140)
LYMPHOCYTES # BLD AUTO: 3.47 X10(3)/MCL (ref 0.6–4.6)
LYMPHOCYTES NFR BLD AUTO: 35.5 %
MCH RBC QN AUTO: 31.5 PG
MCHC RBC AUTO-ENTMCNC: 33 MG/DL (ref 33–36)
MCV RBC AUTO: 95.6 FL (ref 80–94)
MONOCYTES # BLD AUTO: 1.32 X10(3)/MCL (ref 0.1–1.3)
MONOCYTES NFR BLD AUTO: 13.5 %
NEUTROPHILS # BLD AUTO: 4.5 X10(3)/MCL (ref 2.1–9.2)
NEUTROPHILS NFR BLD AUTO: 46 %
NRBC BLD AUTO-RTO: 0 %
PLATELET # BLD AUTO: 448 X10(3)/MCL (ref 130–400)
PMV BLD AUTO: 10.9 FL (ref 7.4–10.4)
POTASSIUM SERPL-SCNC: 4.4 MMOL/L (ref 3.5–5.1)
PROT SERPL-MCNC: 6.5 GM/DL (ref 6.4–8.3)
RBC # BLD AUTO: 4.09 X10(6)/MCL (ref 4.2–5.4)
SODIUM SERPL-SCNC: 140 MMOL/L (ref 136–145)
TRIGL SERPL-MCNC: 151 MG/DL (ref 37–140)
TSH SERPL-ACNC: 1.1 UIU/ML (ref 0.35–4.94)
VLDLC SERPL CALC-MCNC: 30 MG/DL
WBC # SPEC AUTO: 9.8 X10(3)/MCL (ref 4.5–11.5)

## 2023-02-02 PROCEDURE — 85025 COMPLETE CBC W/AUTO DIFF WBC: CPT | Performed by: INTERNAL MEDICINE

## 2023-02-02 PROCEDURE — 80053 COMPREHEN METABOLIC PANEL: CPT | Performed by: INTERNAL MEDICINE

## 2023-02-02 PROCEDURE — 84443 ASSAY THYROID STIM HORMONE: CPT | Performed by: INTERNAL MEDICINE

## 2023-02-02 PROCEDURE — 80061 LIPID PANEL: CPT | Performed by: INTERNAL MEDICINE

## 2023-02-02 PROCEDURE — 83036 HEMOGLOBIN GLYCOSYLATED A1C: CPT | Performed by: INTERNAL MEDICINE

## 2023-06-21 ENCOUNTER — HOSPITAL ENCOUNTER (EMERGENCY)
Facility: HOSPITAL | Age: 43
Discharge: HOME | End: 2023-06-21
Attending: STUDENT IN AN ORGANIZED HEALTH CARE EDUCATION/TRAINING PROGRAM
Payer: MEDICARE

## 2023-06-21 VITALS
RESPIRATION RATE: 16 BRPM | TEMPERATURE: 98 F | OXYGEN SATURATION: 98 % | DIASTOLIC BLOOD PRESSURE: 74 MMHG | SYSTOLIC BLOOD PRESSURE: 120 MMHG | HEART RATE: 99 BPM

## 2023-06-21 DIAGNOSIS — Z93.1 GASTROSTOMY TUBE IN PLACE: Primary | ICD-10-CM

## 2023-06-21 PROCEDURE — 99284 EMERGENCY DEPT VISIT MOD MDM: CPT | Mod: 25

## 2023-06-21 PROCEDURE — 43762 RPLC GTUBE NO REVJ TRC: CPT

## 2023-06-21 NOTE — ED PROVIDER NOTES
Encounter Date: 6/21/2023       History     Chief Complaint   Patient presents with    peg tube problem     Peg tube pulled out 1 hour prior to arrival. Denies complaints.      42 y.o. female with a history of cerebral palsy, DM, HTN, and quadriplegia presents to the ED via EMS from NH for PEG tube replacement. Staff states she pulled it out this morning. Brought with patient is 18 Fr PEG. No other issues at this time     The history is provided by the EMS personnel. The history is limited by the condition of the patient.   Review of patient's allergies indicates:  No Known Allergies  Past Medical History:   Diagnosis Date    DM (diabetes mellitus)     HTN (hypertension)     Quadriplegia      Past Surgical History:   Procedure Laterality Date    GASTROSTOMY TUBE PLACEMENT       No family history on file.     Review of Systems   Unable to perform ROS: Patient nonverbal     Physical Exam     Initial Vitals [06/21/23 1127]   BP Pulse Resp Temp SpO2   120/74 99 16 97.9 °F (36.6 °C) 98 %      MAP       --         Physical Exam    Nursing note and vitals reviewed.  Constitutional: She appears well-developed and well-nourished.   HENT:   Head: Normocephalic and atraumatic.   Eyes: EOM are normal. Pupils are equal, round, and reactive to light.   Neck: Neck supple.   Cardiovascular:  Normal rate, regular rhythm and normal heart sounds.           Pulmonary/Chest: Breath sounds normal.   Abdominal:   Stoma site noted to abdomen, no surrounding erythema   Musculoskeletal:         General: Normal range of motion.      Cervical back: Neck supple.     Neurological: She is alert and oriented to person, place, and time. She has normal strength. GCS score is 15. GCS eye subscore is 4. GCS verbal subscore is 5. GCS motor subscore is 6.   Skin: Skin is warm and dry.   Psychiatric: She has a normal mood and affect.       ED Course   Feeding Tube    Date/Time: 6/21/2023 11:38 AM  Location procedure was performed: [facility] EMERGENCY  "DEPARTMENT  Performed by: Rene Osuna PA-C  Authorized by: Rene Osuna PA-C   Consent: The procedure was performed in an emergent situation. Verbal consent not obtained. Written consent not obtained.  Consent given by: power of   Patient identity confirmed: arm band  Time out: Immediately prior to procedure a "time out" was called to verify the correct patient, procedure, equipment, support staff and site/side marked as required.  Indications: tube dislodged and tube removed by patient    Sedation:  Patient sedated: no    Local anesthesia used: no    Anesthesia:  Local anesthesia used: no  Tube type: gastrostomy  Patient position: supine  Procedure type: replacement  Tube size: 18 Fr  Endoscope used: no  Bulb inflation volume: 10 (ml)  Bulb inflation fluid: normal saline  Placement/position confirmation: x-ray, contrast and gastric contents aspirated  Tube placement difficulty: minimal  Complications: No  Specimens: No  Implants: No  Patient tolerance: patient tolerated the procedure well with no immediate complications      Labs Reviewed - No data to display       Imaging Results              XR Gastric tube check, non-radiologist performed (Final result)  Result time 06/21/23 12:31:56      Final result by Donny Singh MD (06/21/23 12:31:56)                   Impression:      As above.      Electronically signed by: Donny Singh  Date:    06/21/2023  Time:    12:31               Narrative:    EXAMINATION:  XR GASTRIC TUBE CHECK, NON-RADIOLOGIST PERFORMED    CLINICAL HISTORY:  check placement;    TECHNIQUE:  Single view of the abdomen following contrast injection through gastric tube.    COMPARISON:  10/22/2022    FINDINGS:  Contrast demonstrates rugal folds representing appropriately positioned gastric tube.                                       Medications - No data to display  Medical Decision Making:   Initial Assessment:   42 y.o. female with a history of cerebral palsy, DM, HTN, and " quadriplegia presents to the ED via EMS from NH for PEG tube replacement. Staff states she pulled it out this morning. Brought with patient is 18 Fr PEG. No other issues at this time     Differential Diagnosis:   PEG tube replacement  Clinical Tests:   Radiological Study: Reviewed and Ordered                        Clinical Impression:   Final diagnoses:  [Z93.1] Gastrostomy tube in place (Primary)        ED Disposition Condition    Discharge Stable          ED Prescriptions    None       Follow-up Information       Follow up With Specialties Details Why Contact Info    [facility] - Emergency Dept Emergency Medicine In 1 week If symptoms worsen [address] [facility] [address]  [phone]    Primary care provider  In 2 days As needed              Rene Osuna PA-C  06/26/23 1811

## 2023-11-01 ENCOUNTER — LAB REQUISITION (OUTPATIENT)
Dept: LAB | Facility: HOSPITAL | Age: 43
End: 2023-11-01
Payer: MEDICARE

## 2023-11-01 DIAGNOSIS — I10 ESSENTIAL (PRIMARY) HYPERTENSION: ICD-10-CM

## 2023-11-01 DIAGNOSIS — E11.9 TYPE 2 DIABETES MELLITUS WITHOUT COMPLICATIONS: ICD-10-CM

## 2023-11-01 LAB
ALBUMIN SERPL-MCNC: 3.7 G/DL (ref 3.5–5)
ALBUMIN/GLOB SERPL: 1.2 RATIO (ref 1.1–2)
ALP SERPL-CCNC: 126 UNIT/L (ref 40–150)
ALT SERPL-CCNC: 42 UNIT/L (ref 0–55)
AST SERPL-CCNC: 25 UNIT/L (ref 5–34)
BASOPHILS # BLD AUTO: 0.1 X10(3)/MCL
BASOPHILS NFR BLD AUTO: 1.2 %
BILIRUB SERPL-MCNC: 0.4 MG/DL
BUN SERPL-MCNC: 13.6 MG/DL (ref 7–18.7)
CALCIUM SERPL-MCNC: 9.5 MG/DL (ref 8.4–10.2)
CHLORIDE SERPL-SCNC: 111 MMOL/L (ref 98–107)
CHOLEST SERPL-MCNC: 186 MG/DL
CHOLEST/HDLC SERPL: 4 {RATIO} (ref 0–5)
CO2 SERPL-SCNC: 25 MMOL/L (ref 22–29)
CREAT SERPL-MCNC: 0.72 MG/DL (ref 0.55–1.02)
EOSINOPHIL # BLD AUTO: 0.23 X10(3)/MCL (ref 0–0.9)
EOSINOPHIL NFR BLD AUTO: 2.8 %
ERYTHROCYTE [DISTWIDTH] IN BLOOD BY AUTOMATED COUNT: 12.9 % (ref 11.5–17)
EST. AVERAGE GLUCOSE BLD GHB EST-MCNC: 99.7 MG/DL
GFR SERPLBLD CREATININE-BSD FMLA CKD-EPI: >60 MLS/MIN/1.73/M2
GLOBULIN SER-MCNC: 3 GM/DL (ref 2.4–3.5)
GLUCOSE SERPL-MCNC: 124 MG/DL (ref 74–100)
HBA1C MFR BLD: 5.1 %
HCT VFR BLD AUTO: 41.5 % (ref 37–47)
HDLC SERPL-MCNC: 47 MG/DL (ref 35–60)
HGB BLD-MCNC: 13.8 G/DL (ref 12–16)
IMM GRANULOCYTES # BLD AUTO: 0.02 X10(3)/MCL (ref 0–0.04)
IMM GRANULOCYTES NFR BLD AUTO: 0.2 %
LDLC SERPL CALC-MCNC: 112 MG/DL (ref 50–140)
LYMPHOCYTES # BLD AUTO: 3.17 X10(3)/MCL (ref 0.6–4.6)
LYMPHOCYTES NFR BLD AUTO: 38.1 %
MCH RBC QN AUTO: 32.6 PG (ref 27–31)
MCHC RBC AUTO-ENTMCNC: 33.3 G/DL (ref 33–36)
MCV RBC AUTO: 98.1 FL (ref 80–94)
MONOCYTES # BLD AUTO: 0.79 X10(3)/MCL (ref 0.1–1.3)
MONOCYTES NFR BLD AUTO: 9.5 %
NEUTROPHILS # BLD AUTO: 4.02 X10(3)/MCL (ref 2.1–9.2)
NEUTROPHILS NFR BLD AUTO: 48.2 %
NRBC BLD AUTO-RTO: 0 %
PLATELET # BLD AUTO: 358 X10(3)/MCL (ref 130–400)
PMV BLD AUTO: 11.4 FL (ref 7.4–10.4)
POTASSIUM SERPL-SCNC: 4.2 MMOL/L (ref 3.5–5.1)
PROT SERPL-MCNC: 6.7 GM/DL (ref 6.4–8.3)
RBC # BLD AUTO: 4.23 X10(6)/MCL (ref 4.2–5.4)
SODIUM SERPL-SCNC: 143 MMOL/L (ref 136–145)
TRIGL SERPL-MCNC: 135 MG/DL (ref 37–140)
VLDLC SERPL CALC-MCNC: 27 MG/DL
WBC # SPEC AUTO: 8.33 X10(3)/MCL (ref 4.5–11.5)

## 2023-11-01 PROCEDURE — 83036 HEMOGLOBIN GLYCOSYLATED A1C: CPT | Performed by: INTERNAL MEDICINE

## 2023-11-01 PROCEDURE — 85025 COMPLETE CBC W/AUTO DIFF WBC: CPT | Performed by: INTERNAL MEDICINE

## 2023-11-01 PROCEDURE — 80061 LIPID PANEL: CPT | Performed by: INTERNAL MEDICINE

## 2023-11-01 PROCEDURE — 80053 COMPREHEN METABOLIC PANEL: CPT | Performed by: INTERNAL MEDICINE

## 2024-02-14 ENCOUNTER — LAB REQUISITION (OUTPATIENT)
Dept: LAB | Facility: HOSPITAL | Age: 44
End: 2024-02-14
Payer: MEDICARE

## 2024-02-14 DIAGNOSIS — I10 ESSENTIAL (PRIMARY) HYPERTENSION: ICD-10-CM

## 2024-02-14 DIAGNOSIS — E11.9 TYPE 2 DIABETES MELLITUS WITHOUT COMPLICATIONS: ICD-10-CM

## 2024-02-14 LAB
ALBUMIN SERPL-MCNC: 3.7 G/DL (ref 3.5–5)
ALBUMIN/GLOB SERPL: 1.2 RATIO (ref 1.1–2)
ALP SERPL-CCNC: 121 UNIT/L (ref 40–150)
ALT SERPL-CCNC: 30 UNIT/L (ref 0–55)
AST SERPL-CCNC: 22 UNIT/L (ref 5–34)
BASOPHILS # BLD AUTO: 0.12 X10(3)/MCL
BASOPHILS NFR BLD AUTO: 1.3 %
BILIRUB SERPL-MCNC: 0.5 MG/DL
BUN SERPL-MCNC: 13.3 MG/DL (ref 7–18.7)
CALCIUM SERPL-MCNC: 9.6 MG/DL (ref 8.4–10.2)
CHLORIDE SERPL-SCNC: 109 MMOL/L (ref 98–107)
CHOLEST SERPL-MCNC: 191 MG/DL
CHOLEST/HDLC SERPL: 4 {RATIO} (ref 0–5)
CO2 SERPL-SCNC: 21 MMOL/L (ref 22–29)
CREAT SERPL-MCNC: 0.68 MG/DL (ref 0.55–1.02)
EOSINOPHIL # BLD AUTO: 0.76 X10(3)/MCL (ref 0–0.9)
EOSINOPHIL NFR BLD AUTO: 8.5 %
ERYTHROCYTE [DISTWIDTH] IN BLOOD BY AUTOMATED COUNT: 12.8 % (ref 11.5–17)
EST. AVERAGE GLUCOSE BLD GHB EST-MCNC: 91.1 MG/DL
GFR SERPLBLD CREATININE-BSD FMLA CKD-EPI: >60 MLS/MIN/1.73/M2
GLOBULIN SER-MCNC: 3.1 GM/DL (ref 2.4–3.5)
GLUCOSE SERPL-MCNC: 139 MG/DL (ref 74–100)
HBA1C MFR BLD: 4.8 %
HCT VFR BLD AUTO: 43.4 % (ref 37–47)
HDLC SERPL-MCNC: 47 MG/DL (ref 35–60)
HGB BLD-MCNC: 14.6 G/DL (ref 12–16)
IMM GRANULOCYTES # BLD AUTO: 0.02 X10(3)/MCL (ref 0–0.04)
IMM GRANULOCYTES NFR BLD AUTO: 0.2 %
LDLC SERPL CALC-MCNC: 122 MG/DL (ref 50–140)
LYMPHOCYTES # BLD AUTO: 3.12 X10(3)/MCL (ref 0.6–4.6)
LYMPHOCYTES NFR BLD AUTO: 35 %
MCH RBC QN AUTO: 32.7 PG (ref 27–31)
MCHC RBC AUTO-ENTMCNC: 33.6 G/DL (ref 33–36)
MCV RBC AUTO: 97.1 FL (ref 80–94)
MONOCYTES # BLD AUTO: 0.74 X10(3)/MCL (ref 0.1–1.3)
MONOCYTES NFR BLD AUTO: 8.3 %
NEUTROPHILS # BLD AUTO: 4.15 X10(3)/MCL (ref 2.1–9.2)
NEUTROPHILS NFR BLD AUTO: 46.7 %
NRBC BLD AUTO-RTO: 0 %
PLATELET # BLD AUTO: 374 X10(3)/MCL (ref 130–400)
PMV BLD AUTO: 12 FL (ref 7.4–10.4)
POTASSIUM SERPL-SCNC: 4.1 MMOL/L (ref 3.5–5.1)
PROT SERPL-MCNC: 6.8 GM/DL (ref 6.4–8.3)
RBC # BLD AUTO: 4.47 X10(6)/MCL (ref 4.2–5.4)
SODIUM SERPL-SCNC: 141 MMOL/L (ref 136–145)
TRIGL SERPL-MCNC: 111 MG/DL (ref 37–140)
TSH SERPL-ACNC: 0.56 UIU/ML (ref 0.35–4.94)
VLDLC SERPL CALC-MCNC: 22 MG/DL
WBC # SPEC AUTO: 8.91 X10(3)/MCL (ref 4.5–11.5)

## 2024-02-14 PROCEDURE — 85025 COMPLETE CBC W/AUTO DIFF WBC: CPT | Performed by: INTERNAL MEDICINE

## 2024-02-14 PROCEDURE — 80053 COMPREHEN METABOLIC PANEL: CPT | Performed by: INTERNAL MEDICINE

## 2024-02-14 PROCEDURE — 80061 LIPID PANEL: CPT | Performed by: INTERNAL MEDICINE

## 2024-02-14 PROCEDURE — 84443 ASSAY THYROID STIM HORMONE: CPT | Performed by: INTERNAL MEDICINE

## 2024-02-14 PROCEDURE — 83036 HEMOGLOBIN GLYCOSYLATED A1C: CPT | Performed by: INTERNAL MEDICINE

## 2024-05-18 ENCOUNTER — HOSPITAL ENCOUNTER (EMERGENCY)
Facility: HOSPITAL | Age: 44
Discharge: NURSING, REHAB OR LONG-TERM CARE | End: 2024-05-18
Attending: EMERGENCY MEDICINE
Payer: MEDICARE

## 2024-05-18 VITALS
TEMPERATURE: 96 F | OXYGEN SATURATION: 95 % | HEIGHT: 66 IN | BODY MASS INDEX: 22.5 KG/M2 | WEIGHT: 140 LBS | DIASTOLIC BLOOD PRESSURE: 73 MMHG | RESPIRATION RATE: 13 BRPM | SYSTOLIC BLOOD PRESSURE: 104 MMHG | HEART RATE: 85 BPM

## 2024-05-18 DIAGNOSIS — R11.2 NAUSEA AND VOMITING, UNSPECIFIED VOMITING TYPE: Primary | ICD-10-CM

## 2024-05-18 DIAGNOSIS — Z93.1 PRESENCE OF EXTERNALLY REMOVABLE PERCUTANEOUS ENDOSCOPIC GASTROSTOMY (PEG) TUBE: ICD-10-CM

## 2024-05-18 DIAGNOSIS — K59.00 CONSTIPATION, UNSPECIFIED CONSTIPATION TYPE: ICD-10-CM

## 2024-05-18 LAB
ALBUMIN SERPL-MCNC: 3.8 G/DL (ref 3.5–5)
ALBUMIN/GLOB SERPL: 1.2 RATIO (ref 1.1–2)
ALP SERPL-CCNC: 117 UNIT/L (ref 40–150)
ALT SERPL-CCNC: 52 UNIT/L (ref 0–55)
ANION GAP SERPL CALC-SCNC: 9 MEQ/L
AST SERPL-CCNC: 40 UNIT/L (ref 5–34)
BACTERIA #/AREA URNS AUTO: ABNORMAL /HPF
BASOPHILS # BLD AUTO: 0.05 X10(3)/MCL
BASOPHILS NFR BLD AUTO: 0.4 %
BILIRUB SERPL-MCNC: 0.4 MG/DL
BILIRUB UR QL STRIP.AUTO: NEGATIVE
BUN SERPL-MCNC: 18.4 MG/DL (ref 7–18.7)
CALCIUM SERPL-MCNC: 9 MG/DL (ref 8.4–10.2)
CHLORIDE SERPL-SCNC: 110 MMOL/L (ref 98–107)
CLARITY UR: CLEAR
CO2 SERPL-SCNC: 19 MMOL/L (ref 22–29)
COLOR UR AUTO: ABNORMAL
CREAT SERPL-MCNC: 0.67 MG/DL (ref 0.55–1.02)
CREAT/UREA NIT SERPL: 27
EOSINOPHIL # BLD AUTO: 0.1 X10(3)/MCL (ref 0–0.9)
EOSINOPHIL NFR BLD AUTO: 0.9 %
ERYTHROCYTE [DISTWIDTH] IN BLOOD BY AUTOMATED COUNT: 13.2 % (ref 11.5–17)
GFR SERPLBLD CREATININE-BSD FMLA CKD-EPI: >60 ML/MIN/1.73/M2
GLOBULIN SER-MCNC: 3.3 GM/DL (ref 2.4–3.5)
GLUCOSE SERPL-MCNC: 98 MG/DL (ref 74–100)
GLUCOSE UR QL STRIP: NORMAL
HCT VFR BLD AUTO: 44.6 % (ref 37–47)
HGB BLD-MCNC: 14.8 G/DL (ref 12–16)
HGB UR QL STRIP: NEGATIVE
IMM GRANULOCYTES # BLD AUTO: 0.03 X10(3)/MCL (ref 0–0.04)
IMM GRANULOCYTES NFR BLD AUTO: 0.3 %
KETONES UR QL STRIP: ABNORMAL
LEUKOCYTE ESTERASE UR QL STRIP: NEGATIVE
LYMPHOCYTES # BLD AUTO: 2.87 X10(3)/MCL (ref 0.6–4.6)
LYMPHOCYTES NFR BLD AUTO: 25.4 %
MCH RBC QN AUTO: 32.2 PG (ref 27–31)
MCHC RBC AUTO-ENTMCNC: 33.2 G/DL (ref 33–36)
MCV RBC AUTO: 97.2 FL (ref 80–94)
MONOCYTES # BLD AUTO: 1.24 X10(3)/MCL (ref 0.1–1.3)
MONOCYTES NFR BLD AUTO: 11 %
MUCOUS THREADS URNS QL MICRO: ABNORMAL /LPF
NEUTROPHILS # BLD AUTO: 7.02 X10(3)/MCL (ref 2.1–9.2)
NEUTROPHILS NFR BLD AUTO: 62 %
NITRITE UR QL STRIP: NEGATIVE
NRBC BLD AUTO-RTO: 0 %
PH UR STRIP: 5.5 [PH]
PLATELET # BLD AUTO: 368 X10(3)/MCL (ref 130–400)
PMV BLD AUTO: 10.6 FL (ref 7.4–10.4)
POTASSIUM SERPL-SCNC: 3.8 MMOL/L (ref 3.5–5.1)
PROT SERPL-MCNC: 7.1 GM/DL (ref 6.4–8.3)
PROT UR QL STRIP: ABNORMAL
RBC # BLD AUTO: 4.59 X10(6)/MCL (ref 4.2–5.4)
RBC #/AREA URNS AUTO: ABNORMAL /HPF
SODIUM SERPL-SCNC: 138 MMOL/L (ref 136–145)
SP GR UR STRIP.AUTO: >1.05 (ref 1–1.03)
SQUAMOUS #/AREA URNS LPF: ABNORMAL /HPF
UROBILINOGEN UR STRIP-ACNC: 2
WBC # SPEC AUTO: 11.31 X10(3)/MCL (ref 4.5–11.5)
WBC #/AREA URNS AUTO: ABNORMAL /HPF

## 2024-05-18 PROCEDURE — 99285 EMERGENCY DEPT VISIT HI MDM: CPT | Mod: 25

## 2024-05-18 PROCEDURE — 63600175 PHARM REV CODE 636 W HCPCS: Performed by: EMERGENCY MEDICINE

## 2024-05-18 PROCEDURE — 96361 HYDRATE IV INFUSION ADD-ON: CPT

## 2024-05-18 PROCEDURE — 25000003 PHARM REV CODE 250: Performed by: EMERGENCY MEDICINE

## 2024-05-18 PROCEDURE — 25500020 PHARM REV CODE 255: Performed by: EMERGENCY MEDICINE

## 2024-05-18 PROCEDURE — 96374 THER/PROPH/DIAG INJ IV PUSH: CPT | Mod: 59

## 2024-05-18 PROCEDURE — 81001 URINALYSIS AUTO W/SCOPE: CPT | Performed by: EMERGENCY MEDICINE

## 2024-05-18 PROCEDURE — 85025 COMPLETE CBC W/AUTO DIFF WBC: CPT | Performed by: EMERGENCY MEDICINE

## 2024-05-18 PROCEDURE — 80053 COMPREHEN METABOLIC PANEL: CPT | Performed by: EMERGENCY MEDICINE

## 2024-05-18 RX ORDER — ONDANSETRON HYDROCHLORIDE 2 MG/ML
4 INJECTION, SOLUTION INTRAVENOUS
Status: COMPLETED | OUTPATIENT
Start: 2024-05-18 | End: 2024-05-18

## 2024-05-18 RX ORDER — LEVETIRACETAM 100 MG/ML
20 SOLUTION ORAL 2 TIMES DAILY
COMMUNITY

## 2024-05-18 RX ORDER — POLYETHYLENE GLYCOL 3350 17 G/17G
17 POWDER, FOR SOLUTION ORAL DAILY
COMMUNITY

## 2024-05-18 RX ORDER — ACETAMINOPHEN 325 MG/1
325 TABLET ORAL EVERY 6 HOURS PRN
COMMUNITY

## 2024-05-18 RX ORDER — DOCUSATE SODIUM 50 MG/5ML
50 LIQUID ORAL DAILY
COMMUNITY

## 2024-05-18 RX ORDER — BACLOFEN 20 MG/1
20 TABLET ORAL 3 TIMES DAILY
COMMUNITY

## 2024-05-18 RX ORDER — LACTULOSE 10 G/15ML
20 SOLUTION ORAL 2 TIMES DAILY
Qty: 300 ML | Refills: 0 | Status: SHIPPED | OUTPATIENT
Start: 2024-05-18 | End: 2024-05-23

## 2024-05-18 RX ORDER — NAPROXEN SODIUM 220 MG/1
81 TABLET, FILM COATED ORAL DAILY
COMMUNITY

## 2024-05-18 RX ORDER — LORAZEPAM 1 MG/1
1 TABLET ORAL
Status: COMPLETED | OUTPATIENT
Start: 2024-05-18 | End: 2024-05-18

## 2024-05-18 RX ADMIN — IOHEXOL 96 ML: 350 INJECTION, SOLUTION INTRAVENOUS at 08:05

## 2024-05-18 RX ADMIN — LORAZEPAM 1 MG: 1 TABLET ORAL at 09:05

## 2024-05-18 RX ADMIN — SODIUM CHLORIDE 1000 ML: 9 INJECTION, SOLUTION INTRAVENOUS at 07:05

## 2024-05-18 RX ADMIN — ONDANSETRON 4 MG: 2 INJECTION INTRAMUSCULAR; INTRAVENOUS at 07:05

## 2024-05-18 NOTE — ED PROVIDER NOTES
Encounter Date: 5/18/2024    SCRIBE #1 NOTE: I, Eric Pringle, am scribing for, and in the presence of,  Dr. Bray. I have scribed the following portions of the note - Other sections scribed: HPI, ROS, Physical Exam, MDM, Attending.       History     Chief Complaint   Patient presents with    Emesis     Pt. From [facility] for nausea, vomiting, and abdominal pain.Staff also reports PEG tube problem, last feeding 0400.  Pmh Quadriplegia, CP.      44 y/o female with history of HTN, DM and quadriplegia presents to ED via EMS from [facility] for nausea, vomiting and abdominal pain.  Per EMS, NH staff gave her a PEG tube feeding at 0400, but then subsequently noticed that it appeared out of place, so they called EMS.    The history is provided by the EMS personnel. The history is limited by the condition of the patient.     Review of patient's allergies indicates:  No Known Allergies  Past Medical History:   Diagnosis Date    DM (diabetes mellitus)     HTN (hypertension)     Quadriplegia      Past Surgical History:   Procedure Laterality Date    GASTROSTOMY TUBE PLACEMENT       No family history on file.     Review of Systems   Unable to perform ROS: Patient nonverbal       Physical Exam     Initial Vitals [05/18/24 0702]   BP Pulse Resp Temp SpO2   126/78 96 20 96.4 °F (35.8 °C) 95 %      MAP       --         Physical Exam    Nursing note and vitals reviewed.  Constitutional: She appears well-developed and well-nourished. No distress.   HENT:   Head: Atraumatic.   Previous R craniectomy    Eyes: Conjunctivae and EOM are normal.   Able to move R eye   Cardiovascular:  Normal rate and regular rhythm.           Pulmonary/Chest: Breath sounds normal. No respiratory distress.   Abdominal: Abdomen is soft. Bowel sounds are normal. She exhibits distension. There is no abdominal tenderness.   PEG tube in place (no signs of infection at site); abdomen compressible  There is no rebound and no guarding.    Musculoskeletal:      Lumbar back: Normal range of motion.     Neurological: She is alert.   Nonverbal; quadriplegic   Skin: Skin is warm and dry.         ED Course   Procedures  Labs Reviewed   COMPREHENSIVE METABOLIC PANEL - Abnormal; Notable for the following components:       Result Value    Chloride 110 (*)     CO2 19 (*)     AST 40 (*)     All other components within normal limits   URINALYSIS, REFLEX TO URINE CULTURE - Abnormal; Notable for the following components:    Specific Gravity, UA >1.050 (*)     Protein, UA Trace (*)     Ketones, UA Trace (*)     Urobilinogen, UA 2.0 (*)     Mucous, UA Few (*)     All other components within normal limits   CBC WITH DIFFERENTIAL - Abnormal; Notable for the following components:    MCV 97.2 (*)     MCH 32.2 (*)     MPV 10.6 (*)     All other components within normal limits   CBC W/ AUTO DIFFERENTIAL    Narrative:     The following orders were created for panel order CBC auto differential.  Procedure                               Abnormality         Status                     ---------                               -----------         ------                     CBC with Differential[[id number]]        Abnormal            Final result                 Please view results for these tests on the individual orders.          Imaging Results              CT Abdomen Pelvis With IV Contrast NO Oral Contrast (Final result)  Result time 05/18/24 08:48:52      Final result by Donny Singh MD (05/18/24 08:48:52)                   Impression:      No acute abnormality identified within the abdomen and pelvis.Gastric tube is noted within the stomach. The balloon appears inflated within the duodenum.  Stool bolus in the rectum.  Fecal impaction is not excluded.      Electronically signed by: Donny Singh  Date:    05/18/2024  Time:    08:48               Narrative:    EXAMINATION:  CT ABDOMEN PELVIS WITH IV CONTRAST    CLINICAL HISTORY:  History of quadriparesis PEG tube  dependent aphasic presents with nausea vomiting abdominal distention;    TECHNIQUE:  Multidetector IV contrast enhanced axial CT images of the abdomen and pelvis were obtained with coronal and sagittal reconstructions.    Automatic exposure control was utilized to reduce the patient's radiation dose.    DLP= 1254    COMPARISON:  No prior imaging available for comparison.    FINDINGS:  01. HEPATOBILIARY: No focal hepatic lesion is identified, The gallbladder is normal.    02. SPLEEN: Normal    03. PANCREAS: No focal masses or ductal dilatation.    04. ADRENALS: No adrenal nodules.    05. KIDNEYS: The right kidney demonstrates no stone, hydronephrosis, or hydroureter. No focal mass identified. The left kidney demonstrates no stone, hydronephrosis, or hydroureter. No focal mass identified.    06. LYMPHADENOPATHY/RETROPERITONEUM: There is no retroperitoneal lymphadenopathy. The abdominal aorta is normal in course and caliber.    07. BOWEL: Gastric tube is noted within the stomach.  The balloon appears inflated within the duodenum. Stool bolus in the rectum. Fecal impaction is not excluded.    08. PELVIC VISCERA: Normal. No pelvic mass.    09. PELVIC LYMPH NODES: No lymphadenopathy.    10. PERITONEUM/ABDOMINAL WALL: No ascites or implant.    11. SKELETAL: No aggressive appearing lytic/blastic lesion. No acute fractures, subluxations or dislocations.    12. LUNG BASES: The visualized lungs are unremarkable.                                       Medications   sodium chloride 0.9% bolus 1,000 mL 1,000 mL (0 mLs Intravenous Stopped 5/18/24 0833)   ondansetron injection 4 mg (4 mg Intravenous Given 5/18/24 0733)   iohexoL (OMNIPAQUE 350) injection 96 mL (96 mLs Intravenous Given 5/18/24 0842)   LORazepam tablet 1 mg (1 mg Oral Given 5/18/24 0941)     Medical Decision Making  Differential diagnoses include, but are not limited to: constipation, UTI, ileus, small bowel obstruction     Amount and/or Complexity of Data  Reviewed  Independent Historian: EMS     Details:   Per EMS, NH staff gave her a PEG tube feeding at 0400, but then subsequently noticed that it appeared out of place, so they called EMS.  Labs: ordered.  Radiology: ordered.    Risk  Prescription drug management.            Scribe Attestation:   Scribe #1: I performed the above scribed service and the documentation accurately describes the services I performed. I attest to the accuracy of the note.    Attending Attestation:           Physician Attestation for Scribe:  Physician Attestation Statement for Scribe #1: I, reviewed documentation, as scribed by Eric Pringle in my presence, and it is both accurate and complete.             ED Course as of 05/18/24 1106   Sat May 18, 2024   0726 The PEG tube appears to be in proper position on my exam it was secured there is some leakage of confidence around the tube on the gauze but not much.  There is GI contents in the tube it flushes easily per RN. [LF]   0902 Based on CT findings constipation with possible fecal impaction is considerable perform digital rectal exam plan to send patient home with stool softeners [LF]   1047 CT did show large stool burden.  RN reports she did have a bowel movement after CT.  I did perform rectal exam minimal amount of soft yellow stool present no fecal impaction no hard stool present in rectum.  Will continue stool softeners at home [LF]      ED Course User Index  [LF] Baldomero Bray MD                             Clinical Impression:  Final diagnoses:  [R11.2] Nausea and vomiting, unspecified vomiting type (Primary)  [K59.00] Constipation, unspecified constipation type  [Z93.1] Presence of externally removable percutaneous endoscopic gastrostomy (PEG) tube          ED Disposition Condition    Discharge Stable          ED Prescriptions       Medication Sig Dispense Start Date End Date Auth. Provider    lactulose (CHRONULAC) 20 gram/30 mL Soln Take 30 mLs (20 g total) by mouth 2 (two)  times daily. for 5 days 300 mL 5/18/2024 5/23/2024 Baldomero Bray MD          Follow-up Information    None          Baldomero Bray MD  05/18/24 1105

## 2024-08-12 ENCOUNTER — LAB REQUISITION (OUTPATIENT)
Dept: LAB | Facility: HOSPITAL | Age: 44
End: 2024-08-12
Payer: MEDICARE

## 2024-08-12 DIAGNOSIS — I10 ESSENTIAL (PRIMARY) HYPERTENSION: ICD-10-CM

## 2024-08-12 LAB
ALBUMIN SERPL-MCNC: 3.8 G/DL (ref 3.5–5)
ALBUMIN/GLOB SERPL: 1.3 RATIO (ref 1.1–2)
ALP SERPL-CCNC: 121 UNIT/L (ref 40–150)
ALT SERPL-CCNC: 31 UNIT/L (ref 0–55)
ANION GAP SERPL CALC-SCNC: 10 MEQ/L
AST SERPL-CCNC: 15 UNIT/L (ref 5–34)
BASOPHILS # BLD AUTO: 0.09 X10(3)/MCL
BASOPHILS NFR BLD AUTO: 0.9 %
BILIRUB SERPL-MCNC: 0.4 MG/DL
BUN SERPL-MCNC: 14.3 MG/DL (ref 7–18.7)
CALCIUM SERPL-MCNC: 9.8 MG/DL (ref 8.4–10.2)
CHLORIDE SERPL-SCNC: 107 MMOL/L (ref 98–107)
CHOLEST SERPL-MCNC: 186 MG/DL
CHOLEST/HDLC SERPL: 4 {RATIO} (ref 0–5)
CO2 SERPL-SCNC: 26 MMOL/L (ref 22–29)
CREAT SERPL-MCNC: 0.68 MG/DL (ref 0.55–1.02)
CREAT/UREA NIT SERPL: 21
EOSINOPHIL # BLD AUTO: 0.14 X10(3)/MCL (ref 0–0.9)
EOSINOPHIL NFR BLD AUTO: 1.4 %
ERYTHROCYTE [DISTWIDTH] IN BLOOD BY AUTOMATED COUNT: 13 % (ref 11.5–17)
EST. AVERAGE GLUCOSE BLD GHB EST-MCNC: 102.5 MG/DL
GFR SERPLBLD CREATININE-BSD FMLA CKD-EPI: >60 ML/MIN/1.73/M2
GLOBULIN SER-MCNC: 3 GM/DL (ref 2.4–3.5)
GLUCOSE SERPL-MCNC: 82 MG/DL (ref 74–100)
HBA1C MFR BLD: 5.2 %
HCT VFR BLD AUTO: 43.1 % (ref 37–47)
HDLC SERPL-MCNC: 51 MG/DL (ref 35–60)
HGB BLD-MCNC: 14.2 G/DL (ref 12–16)
IMM GRANULOCYTES # BLD AUTO: 0.01 X10(3)/MCL (ref 0–0.04)
IMM GRANULOCYTES NFR BLD AUTO: 0.1 %
LDLC SERPL CALC-MCNC: 119 MG/DL (ref 50–140)
LYMPHOCYTES # BLD AUTO: 4.34 X10(3)/MCL (ref 0.6–4.6)
LYMPHOCYTES NFR BLD AUTO: 44.9 %
MCH RBC QN AUTO: 32.1 PG (ref 27–31)
MCHC RBC AUTO-ENTMCNC: 32.9 G/DL (ref 33–36)
MCV RBC AUTO: 97.5 FL (ref 80–94)
MONOCYTES # BLD AUTO: 1.04 X10(3)/MCL (ref 0.1–1.3)
MONOCYTES NFR BLD AUTO: 10.8 %
NEUTROPHILS # BLD AUTO: 4.05 X10(3)/MCL (ref 2.1–9.2)
NEUTROPHILS NFR BLD AUTO: 41.9 %
NRBC BLD AUTO-RTO: 0 %
PLATELET # BLD AUTO: 347 X10(3)/MCL (ref 130–400)
PMV BLD AUTO: 11.1 FL (ref 7.4–10.4)
POTASSIUM SERPL-SCNC: 4.1 MMOL/L (ref 3.5–5.1)
PROT SERPL-MCNC: 6.8 GM/DL (ref 6.4–8.3)
RBC # BLD AUTO: 4.42 X10(6)/MCL (ref 4.2–5.4)
SODIUM SERPL-SCNC: 143 MMOL/L (ref 136–145)
TRIGL SERPL-MCNC: 80 MG/DL (ref 37–140)
VLDLC SERPL CALC-MCNC: 16 MG/DL
WBC # BLD AUTO: 9.67 X10(3)/MCL (ref 4.5–11.5)

## 2024-08-12 PROCEDURE — 85025 COMPLETE CBC W/AUTO DIFF WBC: CPT | Performed by: INTERNAL MEDICINE

## 2024-08-12 PROCEDURE — 83036 HEMOGLOBIN GLYCOSYLATED A1C: CPT | Performed by: INTERNAL MEDICINE

## 2024-08-12 PROCEDURE — 80053 COMPREHEN METABOLIC PANEL: CPT | Performed by: INTERNAL MEDICINE

## 2024-08-12 PROCEDURE — 80061 LIPID PANEL: CPT | Performed by: INTERNAL MEDICINE

## 2024-10-04 ENCOUNTER — HOSPITAL ENCOUNTER (EMERGENCY)
Facility: HOSPITAL | Age: 44
Discharge: NURSING, REHAB OR LONG-TERM CARE | End: 2024-10-05
Attending: EMERGENCY MEDICINE
Payer: MEDICARE

## 2024-10-04 DIAGNOSIS — K94.23 PEG TUBE MALFUNCTION: Primary | ICD-10-CM

## 2024-10-04 PROCEDURE — 99284 EMERGENCY DEPT VISIT MOD MDM: CPT | Mod: 25

## 2024-10-04 PROCEDURE — 25000003 PHARM REV CODE 250

## 2024-10-04 PROCEDURE — 43762 RPLC GTUBE NO REVJ TRC: CPT

## 2024-10-04 PROCEDURE — A4216 STERILE WATER/SALINE, 10 ML: HCPCS

## 2024-10-04 PROCEDURE — 96374 THER/PROPH/DIAG INJ IV PUSH: CPT

## 2024-10-04 PROCEDURE — 63600175 PHARM REV CODE 636 W HCPCS: Performed by: EMERGENCY MEDICINE

## 2024-10-04 PROCEDURE — 96375 TX/PRO/DX INJ NEW DRUG ADDON: CPT

## 2024-10-04 RX ORDER — PROPOFOL 10 MG/ML
INJECTION, EMULSION INTRAVENOUS
Status: DISCONTINUED
Start: 2024-10-04 | End: 2024-10-05 | Stop reason: HOSPADM

## 2024-10-04 RX ORDER — DROPERIDOL 2.5 MG/ML
1.25 INJECTION, SOLUTION INTRAMUSCULAR; INTRAVENOUS ONCE
Status: COMPLETED | OUTPATIENT
Start: 2024-10-04 | End: 2024-10-04

## 2024-10-04 RX ORDER — MORPHINE SULFATE 4 MG/ML
4 INJECTION, SOLUTION INTRAMUSCULAR; INTRAVENOUS
Status: COMPLETED | OUTPATIENT
Start: 2024-10-04 | End: 2024-10-04

## 2024-10-04 RX ORDER — MORPHINE SULFATE 4 MG/ML
8 INJECTION, SOLUTION INTRAMUSCULAR; INTRAVENOUS
Status: DISCONTINUED | OUTPATIENT
Start: 2024-10-04 | End: 2024-10-05 | Stop reason: HOSPADM

## 2024-10-04 RX ORDER — WATER FOR INJECTION,STERILE
VIAL (ML) INJECTION
Status: COMPLETED
Start: 2024-10-04 | End: 2024-10-04

## 2024-10-04 RX ORDER — PROPOFOL 10 MG/ML
VIAL (ML) INTRAVENOUS CODE/TRAUMA/SEDATION MEDICATION
Status: COMPLETED | OUTPATIENT
Start: 2024-10-04 | End: 2024-10-04

## 2024-10-04 RX ADMIN — PROPOFOL 30 MG: 10 INJECTION, EMULSION INTRAVENOUS at 11:10

## 2024-10-04 RX ADMIN — PROPOFOL 30 MG: 10 INJECTION, EMULSION INTRAVENOUS at 10:10

## 2024-10-04 RX ADMIN — WATER 5 ML: 1 INJECTION INTRAMUSCULAR; INTRAVENOUS; SUBCUTANEOUS at 10:10

## 2024-10-04 RX ADMIN — MORPHINE SULFATE 4 MG: 4 INJECTION INTRAVENOUS at 10:10

## 2024-10-04 RX ADMIN — DROPERIDOL 1.25 MG: 2.5 INJECTION, SOLUTION INTRAMUSCULAR; INTRAVENOUS at 10:10

## 2024-10-04 RX ADMIN — PROPOFOL 20 MG: 10 INJECTION, EMULSION INTRAVENOUS at 11:10

## 2024-10-05 VITALS
DIASTOLIC BLOOD PRESSURE: 81 MMHG | TEMPERATURE: 98 F | RESPIRATION RATE: 14 BRPM | SYSTOLIC BLOOD PRESSURE: 119 MMHG | OXYGEN SATURATION: 98 % | HEART RATE: 73 BPM

## 2024-10-05 NOTE — ED PROVIDER NOTES
Encounter Date: 10/4/2024       History     Chief Complaint   Patient presents with    PEG tube replacement      Arrives [facility] from [facility] for peg tube replacement      Patient is a transferred from nursing home secondary to dislodged PEG tube earlier tonight.  Patient is nonverbal.  PEG tube came out earlier tonight supposedly after feeding.  The balloon was broken on the tube.  Patient had a an 18 Malaysian PEG tube inserted before it came out.      Review of patient's allergies indicates:  No Known Allergies  Past Medical History:   Diagnosis Date    DM (diabetes mellitus)     HTN (hypertension)     Quadriplegia      Past Surgical History:   Procedure Laterality Date    GASTROSTOMY TUBE PLACEMENT       No family history on file.     Review of Systems   Unable to perform ROS: Patient nonverbal       Physical Exam     Initial Vitals [10/04/24 2151]   BP Pulse Resp Temp SpO2   107/67 71 16 97.5 °F (36.4 °C) 100 %      MAP       --         Physical Exam    Nursing note and vitals reviewed.  Constitutional:   Nonverbal female in no apparent distress.  She will open eyes spontaneously.   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           Pulmonary/Chest: Breath sounds normal. No respiratory distress. She has no wheezes. She has no rhonchi.   Abdominal: Abdomen is soft.   Patient has a PEG stoma to the left upper quadrant with no PEG tube.  There is no apparent abdominal tenderness to palpation.   Musculoskeletal:      Comments: Patient has contractures of all extremities     Skin: Skin is warm.         ED Course   Procedural Sedation        Date/Time: 10/4/2024 11:13 PM    Performed by: Bill Rico MD  Authorized by: Bill Rico MD  Consent Done: Emergent Situation  ASA Class: Class 3 - Systemic Illness with functional impairment.  Mallampati Score: Class 2 -  Visualization of the soft palate, fauces, and uvula.     Equipment: on cardiac monitor., on supplemental oxygen., on BP monitor., suction available., on CO2 monitor. and airway equipment available.     Sedation type: moderate (conscious) sedation    Sedatives: propofol  Sedation start date/time: 10/4/2024 10:55 PM  Sedation end date/time: 10/4/2024 11:12 PM  Vitals: Vital signs were monitored during sedation.  Complications: No complications.       Feeding Tube    Date/Time: 10/4/2024 11:15 PM  Location procedure was performed: [facility] EMERGENCY DEPARTMENT    Performed by: Bill Rico MD  Authorized by: Bill Rico MD  Patient understanding: patient does not state understanding of the procedure being performed  Patient consent: the patient's understanding of the procedure does not match consent given  Indications: tube dislodged  Preparation: Patient was prepped and draped in the usual sterile fashion.    Sedation:  Patient sedated: yes  Sedation type: moderate (conscious) sedation  Sedatives: propofol  Sedation start date/time: 10/4/2024 10:55 PM  Sedation end date/time: 10/4/2024 11:12 PM    Local anesthesia used: no    Anesthesia:  Local anesthesia used: no  Tube type: gastrojejunostomy  Patient position: supine  Procedure type: replacement  Tube size: 14 Fr  Bulb inflation volume: 5 (ml)  Placement/position confirmation: x-ray and contrast  Tube placement difficulty: moderate  Complications: Yes  Estimated blood loss (mL): 0  Specimens: No  Implants: No  Patient tolerance: patient tolerated the procedure well with no immediate complications  Comments: I tried to establish 18 Spanish PEG tube in the PEG tube stoma after giving patient morphine and Zofran.  Patient kept straining and moving about.  I could not get an 18 Spanish tube to pass.  Decided to do sedation with propofol.  I managed to insert a 14 Spanish PEG tube without much difficulty.  No problems with sedation        Labs Reviewed - No data  to display       Imaging Results              XR Gastric tube check, non-radiologist performed (In process)  Result time 10/04/24 23:10:15                  X-Rays:   Independently Interpreted Readings:   Other Readings:  Good PEG tube placement per x-ray with contrast    Medications   sterile water for injection injection (has no administration in time range)   morphine injection 8 mg (4 mg Intravenous Not Given 10/4/24 2235)   propofoL (DIPRIVAN) 10 mg/mL infusion (has no administration in time range)   propofol (DIPRIVAN) 10 mg/mL IVP (30 mg Intravenous Given 10/4/24 2301)   morphine injection 4 mg (4 mg Intravenous Given 10/4/24 2214)   droPERidol injection 1.25 mg (1.25 mg Intravenous Given 10/4/24 2219)     Medical Decision Making  Differential diagnosis: Dislodged PEG tube.    Amount and/or Complexity of Data Reviewed  Radiology: ordered.  Discussion of management or test interpretation with external provider(s): PEG tube was replaced under conscious sedation with a 14 Qatari PEG tube    Risk  Prescription drug management.               ED Course as of 10/04/24 2329   Fri Oct 04, 2024   2328 Patient is waking up from sedation.  She opens eyes spontaneously [KG]      ED Course User Index  [KG] Bill Rico MD                           Clinical Impression:  Final diagnoses:  [K94.23] PEG tube malfunction (Primary)          ED Disposition Condition    Discharge Stable          ED Prescriptions    None       Follow-up Information    None          Bill Rico MD  10/04/24 2329       Bill Rico MD  10/04/24 2329

## 2024-11-21 ENCOUNTER — LAB REQUISITION (OUTPATIENT)
Dept: LAB | Facility: HOSPITAL | Age: 44
End: 2024-11-21
Payer: MEDICARE

## 2024-11-21 DIAGNOSIS — I10 ESSENTIAL (PRIMARY) HYPERTENSION: ICD-10-CM

## 2024-11-21 DIAGNOSIS — D64.9 ANEMIA, UNSPECIFIED: ICD-10-CM

## 2024-11-21 LAB
ALBUMIN SERPL-MCNC: 4 G/DL (ref 3.5–5)
ALBUMIN/GLOB SERPL: 1.3 RATIO (ref 1.1–2)
ALP SERPL-CCNC: 126 UNIT/L (ref 40–150)
ALT SERPL-CCNC: 24 UNIT/L (ref 0–55)
ANION GAP SERPL CALC-SCNC: 11 MEQ/L
AST SERPL-CCNC: 16 UNIT/L (ref 5–34)
BASOPHILS # BLD AUTO: 0.13 X10(3)/MCL
BASOPHILS NFR BLD AUTO: 1 %
BILIRUB SERPL-MCNC: 0.3 MG/DL
BUN SERPL-MCNC: 13.8 MG/DL (ref 7–18.7)
CALCIUM SERPL-MCNC: 9.9 MG/DL (ref 8.4–10.2)
CHLORIDE SERPL-SCNC: 107 MMOL/L (ref 98–107)
CO2 SERPL-SCNC: 27 MMOL/L (ref 22–29)
CREAT SERPL-MCNC: 0.67 MG/DL (ref 0.55–1.02)
CREAT/UREA NIT SERPL: 21
EOSINOPHIL # BLD AUTO: 0.32 X10(3)/MCL (ref 0–0.9)
EOSINOPHIL NFR BLD AUTO: 2.5 %
ERYTHROCYTE [DISTWIDTH] IN BLOOD BY AUTOMATED COUNT: 12.7 % (ref 11.5–17)
GFR SERPLBLD CREATININE-BSD FMLA CKD-EPI: >60 ML/MIN/1.73/M2
GLOBULIN SER-MCNC: 3.2 GM/DL (ref 2.4–3.5)
GLUCOSE SERPL-MCNC: 94 MG/DL (ref 74–100)
HCT VFR BLD AUTO: 42.7 % (ref 37–47)
HGB BLD-MCNC: 14.2 G/DL (ref 12–16)
IMM GRANULOCYTES # BLD AUTO: 0.05 X10(3)/MCL (ref 0–0.04)
IMM GRANULOCYTES NFR BLD AUTO: 0.4 %
LYMPHOCYTES # BLD AUTO: 4.28 X10(3)/MCL (ref 0.6–4.6)
LYMPHOCYTES NFR BLD AUTO: 33.4 %
MCH RBC QN AUTO: 32.5 PG (ref 27–31)
MCHC RBC AUTO-ENTMCNC: 33.3 G/DL (ref 33–36)
MCV RBC AUTO: 97.7 FL (ref 80–94)
MONOCYTES # BLD AUTO: 1.27 X10(3)/MCL (ref 0.1–1.3)
MONOCYTES NFR BLD AUTO: 9.9 %
NEUTROPHILS # BLD AUTO: 6.75 X10(3)/MCL (ref 2.1–9.2)
NEUTROPHILS NFR BLD AUTO: 52.8 %
NRBC BLD AUTO-RTO: 0 %
PLATELET # BLD AUTO: 368 X10(3)/MCL (ref 130–400)
PMV BLD AUTO: 10.9 FL (ref 7.4–10.4)
POTASSIUM SERPL-SCNC: 4.2 MMOL/L (ref 3.5–5.1)
PROT SERPL-MCNC: 7.2 GM/DL (ref 6.4–8.3)
RBC # BLD AUTO: 4.37 X10(6)/MCL (ref 4.2–5.4)
SODIUM SERPL-SCNC: 145 MMOL/L (ref 136–145)
WBC # BLD AUTO: 12.8 X10(3)/MCL (ref 4.5–11.5)

## 2024-11-21 PROCEDURE — 80053 COMPREHEN METABOLIC PANEL: CPT | Performed by: INTERNAL MEDICINE

## 2024-11-21 PROCEDURE — 85025 COMPLETE CBC W/AUTO DIFF WBC: CPT | Performed by: INTERNAL MEDICINE

## 2024-11-25 ENCOUNTER — LAB REQUISITION (OUTPATIENT)
Dept: LAB | Facility: HOSPITAL | Age: 44
End: 2024-11-25
Payer: MEDICARE

## 2024-11-25 DIAGNOSIS — I10 ESSENTIAL (PRIMARY) HYPERTENSION: ICD-10-CM

## 2024-11-25 LAB
BASOPHILS # BLD AUTO: 0.11 X10(3)/MCL
BASOPHILS NFR BLD AUTO: 1.1 %
EOSINOPHIL # BLD AUTO: 0.36 X10(3)/MCL (ref 0–0.9)
EOSINOPHIL NFR BLD AUTO: 3.7 %
ERYTHROCYTE [DISTWIDTH] IN BLOOD BY AUTOMATED COUNT: 13.1 % (ref 11.5–17)
HCT VFR BLD AUTO: 39.2 % (ref 37–47)
HGB BLD-MCNC: 13 G/DL (ref 12–16)
IMM GRANULOCYTES # BLD AUTO: 0.02 X10(3)/MCL (ref 0–0.04)
IMM GRANULOCYTES NFR BLD AUTO: 0.2 %
LYMPHOCYTES # BLD AUTO: 3.45 X10(3)/MCL (ref 0.6–4.6)
LYMPHOCYTES NFR BLD AUTO: 35.6 %
MCH RBC QN AUTO: 32.3 PG (ref 27–31)
MCHC RBC AUTO-ENTMCNC: 33.2 G/DL (ref 33–36)
MCV RBC AUTO: 97.5 FL (ref 80–94)
MONOCYTES # BLD AUTO: 0.96 X10(3)/MCL (ref 0.1–1.3)
MONOCYTES NFR BLD AUTO: 9.9 %
NEUTROPHILS # BLD AUTO: 4.8 X10(3)/MCL (ref 2.1–9.2)
NEUTROPHILS NFR BLD AUTO: 49.5 %
NRBC BLD AUTO-RTO: 0 %
PLATELET # BLD AUTO: 331 X10(3)/MCL (ref 130–400)
PMV BLD AUTO: 11.1 FL (ref 7.4–10.4)
RBC # BLD AUTO: 4.02 X10(6)/MCL (ref 4.2–5.4)
WBC # BLD AUTO: 9.7 X10(3)/MCL (ref 4.5–11.5)

## 2024-11-25 PROCEDURE — 85025 COMPLETE CBC W/AUTO DIFF WBC: CPT | Performed by: INTERNAL MEDICINE

## 2025-01-02 ENCOUNTER — HOSPITAL ENCOUNTER (EMERGENCY)
Facility: HOSPITAL | Age: 45
Discharge: NURSING, REHAB OR LONG-TERM CARE | End: 2025-01-02
Payer: MEDICARE

## 2025-01-02 VITALS
RESPIRATION RATE: 16 BRPM | DIASTOLIC BLOOD PRESSURE: 58 MMHG | SYSTOLIC BLOOD PRESSURE: 106 MMHG | TEMPERATURE: 97 F | HEART RATE: 69 BPM | HEIGHT: 66 IN | BODY MASS INDEX: 21.69 KG/M2 | WEIGHT: 135 LBS | OXYGEN SATURATION: 97 %

## 2025-01-02 DIAGNOSIS — K94.23 PEG TUBE MALFUNCTION: Primary | ICD-10-CM

## 2025-01-02 PROCEDURE — 99284 EMERGENCY DEPT VISIT MOD MDM: CPT | Mod: 25

## 2025-01-02 PROCEDURE — 43762 RPLC GTUBE NO REVJ TRC: CPT

## 2025-01-02 NOTE — ED PROVIDER NOTES
Encounter Date: 1/2/2025       History     Chief Complaint   Patient presents with    peg tube problem     Sent from [facility] after peg tube pulled out. Unsure how long peg has been out for.      The patient is a 44 y.o. female with a history of hypertension, diabetes, and quadriplegia who presents to the Emergency Department with a chief complaint of PEG tube replacement. Per EMS staff PEG tube has been out for unknown time. It was last seen in at 0400 this morning. Patient has 14f tube. Symptoms began today and have been constant since onset. Her pain is currently rated as a 0/10 in severity. Associated symptoms include nothing. Symptoms are aggravated with nothing and there are no alleviating factors. She reports taking nothing prior to arrival with no relief of symptoms. No other reported symptoms at this time.      The history is provided by the patient. No  was used.   GI Problem      Review of patient's allergies indicates:  No Known Allergies  Past Medical History:   Diagnosis Date    DM (diabetes mellitus)     HTN (hypertension)     Quadriplegia      Past Surgical History:   Procedure Laterality Date    GASTROSTOMY TUBE PLACEMENT       No family history on file.     Review of Systems   Unable to perform ROS: Patient nonverbal   Gastrointestinal:         PEG tube out   All other systems reviewed and are negative.      Physical Exam     Initial Vitals [01/02/25 1139]   BP Pulse Resp Temp SpO2   (!) 106/58 69 16 96.8 °F (36 °C) 97 %      MAP       --         Physical Exam    Nursing note and vitals reviewed.  Constitutional: She appears well-developed and well-nourished.   HENT:   Head: Normocephalic.   Right Ear: Hearing and tympanic membrane normal.   Left Ear: Hearing and tympanic membrane normal. Mouth/Throat: Uvula is midline, oropharynx is clear and moist and mucous membranes are normal.   Cardiovascular:  Normal rate, regular rhythm, normal heart sounds and normal pulses.        "    Pulmonary/Chest: Effort normal and breath sounds normal.   Abdominal: Abdomen is soft. Bowel sounds are normal. There is no abdominal tenderness.   LUQ stoma present     Lymphadenopathy:     She has no cervical adenopathy.   Neurological: She is alert. GCS eye subscore is 4. GCS verbal subscore is 5. GCS motor subscore is 6.   Contractures in all extremities    Skin: Skin is warm, dry and intact. Capillary refill takes less than 2 seconds.         ED Course   Feeding Tube    Date/Time: 1/2/2025 11:53 AM  Location procedure was performed: [facility] EMERGENCY DEPARTMENT    Performed by: Juanis Allen NP  Authorized by: Juanis Allen NP  Required items: required blood products, implants, devices, and special equipment available  Patient identity confirmed: hospital-assigned identification number, provided demographic data and arm band  Time out: Immediately prior to procedure a "time out" was called to verify the correct patient, procedure, equipment, support staff and site/side marked as required.  Indications: tube dislodged  Preparation: Patient was prepped and draped in the usual sterile fashion.    Sedation:  Patient sedated: no    Tube type: gastrostomy  Patient position: supine  Procedure type: replacement  Tube size: 14 Fr  Bulb inflation volume: 5 (ml)  Bulb inflation fluid: normal saline  Placement/position confirmation: x-ray and contrast  Complications: No  Specimens: No  Implants: No  Patient tolerance: patient tolerated the procedure well with no immediate complications        Labs Reviewed - No data to display       Imaging Results              XR Gastric tube check, non-radiologist performed (Final result)  Result time 01/02/25 12:06:10      Final result by Bailey Hua MD (01/02/25 12:06:10)                   Impression:      No evidence of extravasation      Electronically signed by: Bailey Hua  Date:    01/02/2025  Time:    12:06               Narrative:    EXAMINATION:  XR " GASTRIC TUBE CHECK, NON-RADIOLOGIST PERFORMED    CLINICAL HISTORY:  PEG replacement;    COMPARISON:  X-rays dated 10/04/2024    FINDINGS:  Contrast injected through the gastrostomy tube opacifies the stomach without evidence of extravasation                                       Medications - No data to display  Medical Decision Making  The patient is a 44 y.o. female with a history of hypertension, diabetes, and quadriplegia who presents to the Emergency Department with a chief complaint of PEG tube replacement. Per EMS staff PEG tube has been out for unknown time. It was last seen in at 0400 this morning. Patient has 14f tube. Symptoms began today and have been constant since onset. Her pain is currently rated as a 0/10 in severity. Associated symptoms include nothing. Symptoms are aggravated with nothing and there are no alleviating factors. She reports taking nothing prior to arrival with no relief of symptoms. No other reported symptoms at this time.      Judging by the patient's chief complaint and pertinent history, the patient has the following possible differential diagnoses, including but not limited to the following.  Some of these are deemed to be lower likelihood and some more likely based on my physical exam and history combined with possible lab work and/or imaging studies.   Please see the pertinent studies, and refer to the HPI.  Some of these diagnoses will take further evaluation to fully rule out, perhaps as an outpatient and the patient was encouraged to follow up when discharged for more comprehensive evaluation.    PEG tube replacement         Problems Addressed:  PEG tube malfunction: acute illness or injury    Amount and/or Complexity of Data Reviewed  Radiology: ordered.                                      Clinical Impression:  Final diagnoses:  [K94.23] PEG tube malfunction (Primary)          ED Disposition Condition    Discharge Stable          ED Prescriptions    None       Follow-up  Information       Follow up With Specialties Details Why Contact Info    [facility] Emergency Medicine Go to  If symptoms worsen [address] [id number] [phone]    Primary care physician  Schedule an appointment as soon as possible for a visit   Follow up with you primary care physician.   If you do not have a primary care physician call [phone] to schedule an appointment.             Juanis Allen, GASTON  01/02/25 1246

## 2025-01-02 NOTE — DISCHARGE INSTRUCTIONS
Thanks for letting use take care of you today! It is our goal to give you courteous care and to keep you comfortable and informed. If you have any questions before you leave I will be happy to try and answer them.     Advice after your visit:  Your visit in the emergency department is NOT definitive care - please follow-up with your primary care doctor and/or specialist within 1-2 days. If you do not have a primary care physician call [phone] to schedule an appointment. Please return if you have any worsening in your condition or if you have any other concerns.    Return to the emergency department if any worsening symptoms including fever, chest pain, difficulty breathing, weakness, numbness, tingling, nausea, vomiting, inability to eat, drink or take your medication, or any other new symptoms or concerns arise.      Please signup for MyChart as noted below in your paperwork to review all labwork, imaging results, and any other incidental findings from today's visit.     If you had radiology exams like an XRAY or CT in the emergency Department the interpreation on them may be preliminary - there may be less time sensitive findings on the reports please obtain these reports within 24 hours from the hospital or by using your out on your mobile phone to access records.  Bring these to your primary care doctor and/or specialist for further review of incidental findings.    Please review any LAB WORK from your visit today with your primary care physician.    If you were prescribed OPIATE PAIN MEDICATION - please understand of these medications can be addictive, you may fill less of the prescription was written for, you do not have to take the full prescription.  You may discard what you do not use.  Please seek help if you feel you are having problems with addiction.  Do not drive or operate heavy machinery if you are taking sedating medications.  Do not mix these medications with alcohol.      If you had a SPLINT  placed in the emergency department if you have severe pain numbness tingling or discoloration of year digits please remove the splint and return to the emergency department for further evaluation as this may represent a sign of compromise to the nerves or blood vessels due to swelling.    If you had SUTURES in the emergency department please have them removed in the prescribed time frame typically within 7-14 days.  You may shower but please do not bathe or swim.  Keep the wounds clean and dry and covered with a clean dressing.  Please return if he have any signs of infection like redness or drainage or pain at the suture site.    Please take the full course of  any ANTIBIOTICS you were prescribed - incomplete courses of antibiotics can cause resistance to antibiotics in the future which will make it difficult to treat any infections you may have.

## 2025-01-02 NOTE — ED NOTES
Pt d/c and transported back to NH via [facility]. D/c paperwork given to [facility]. Pt in no acute distress at this time.

## 2025-01-02 NOTE — ED NOTES
14 fr peg tube successfully inserted per ROBERT Allen NP. Awaiting XR for confirmation of placement.

## 2025-01-27 ENCOUNTER — LAB REQUISITION (OUTPATIENT)
Dept: LAB | Facility: HOSPITAL | Age: 45
End: 2025-01-27
Payer: MEDICARE

## 2025-01-27 DIAGNOSIS — R05.9 COUGH, UNSPECIFIED: ICD-10-CM

## 2025-01-27 LAB
B PERT.PT PRMT NPH QL NAA+NON-PROBE: NOT DETECTED
C PNEUM DNA NPH QL NAA+NON-PROBE: NOT DETECTED
HADV DNA NPH QL NAA+NON-PROBE: NOT DETECTED
HCOV 229E RNA NPH QL NAA+NON-PROBE: NOT DETECTED
HCOV HKU1 RNA NPH QL NAA+NON-PROBE: NOT DETECTED
HCOV NL63 RNA NPH QL NAA+NON-PROBE: NOT DETECTED
HCOV OC43 RNA NPH QL NAA+NON-PROBE: NOT DETECTED
HMPV RNA NPH QL NAA+NON-PROBE: NOT DETECTED
HPIV1 RNA NPH QL NAA+NON-PROBE: NOT DETECTED
HPIV2 RNA NPH QL NAA+NON-PROBE: NOT DETECTED
HPIV3 RNA NPH QL NAA+NON-PROBE: NOT DETECTED
HPIV4 RNA NPH QL NAA+NON-PROBE: NOT DETECTED
M PNEUMO DNA NPH QL NAA+NON-PROBE: NOT DETECTED
RSV RNA NPH QL NAA+NON-PROBE: NOT DETECTED
RV+EV RNA NPH QL NAA+NON-PROBE: NOT DETECTED

## 2025-01-27 PROCEDURE — 87798 DETECT AGENT NOS DNA AMP: CPT | Performed by: INTERNAL MEDICINE

## 2025-02-05 ENCOUNTER — LAB REQUISITION (OUTPATIENT)
Dept: LAB | Facility: HOSPITAL | Age: 45
End: 2025-02-05
Payer: MEDICARE

## 2025-02-05 DIAGNOSIS — I10 ESSENTIAL (PRIMARY) HYPERTENSION: ICD-10-CM

## 2025-02-05 DIAGNOSIS — E78.5 HYPERLIPIDEMIA, UNSPECIFIED: ICD-10-CM

## 2025-02-05 LAB
ALBUMIN SERPL-MCNC: 3.3 G/DL (ref 3.5–5)
ALBUMIN/GLOB SERPL: 1.1 RATIO (ref 1.1–2)
ALP SERPL-CCNC: 117 UNIT/L (ref 40–150)
ALT SERPL-CCNC: 63 UNIT/L (ref 0–55)
ANION GAP SERPL CALC-SCNC: 8 MEQ/L
AST SERPL-CCNC: 29 UNIT/L (ref 5–34)
BASOPHILS # BLD AUTO: 0.11 X10(3)/MCL
BASOPHILS NFR BLD AUTO: 0.9 %
BILIRUB SERPL-MCNC: 0.3 MG/DL
BUN SERPL-MCNC: 15.5 MG/DL (ref 7–18.7)
CALCIUM SERPL-MCNC: 9 MG/DL (ref 8.4–10.2)
CHLORIDE SERPL-SCNC: 107 MMOL/L (ref 98–107)
CHOLEST SERPL-MCNC: 179 MG/DL
CHOLEST/HDLC SERPL: 4 {RATIO} (ref 0–5)
CO2 SERPL-SCNC: 29 MMOL/L (ref 22–29)
CREAT SERPL-MCNC: 0.71 MG/DL (ref 0.55–1.02)
CREAT/UREA NIT SERPL: 22
EOSINOPHIL # BLD AUTO: 0.19 X10(3)/MCL (ref 0–0.9)
EOSINOPHIL NFR BLD AUTO: 1.5 %
ERYTHROCYTE [DISTWIDTH] IN BLOOD BY AUTOMATED COUNT: 12.9 % (ref 11.5–17)
EST. AVERAGE GLUCOSE BLD GHB EST-MCNC: 105.4 MG/DL
GFR SERPLBLD CREATININE-BSD FMLA CKD-EPI: >60 ML/MIN/1.73/M2
GLOBULIN SER-MCNC: 3.1 GM/DL (ref 2.4–3.5)
GLUCOSE SERPL-MCNC: 86 MG/DL (ref 74–100)
HBA1C MFR BLD: 5.3 %
HCT VFR BLD AUTO: 42.4 % (ref 37–47)
HDLC SERPL-MCNC: 49 MG/DL (ref 35–60)
HGB BLD-MCNC: 13.7 G/DL (ref 12–16)
IMM GRANULOCYTES # BLD AUTO: 0.09 X10(3)/MCL (ref 0–0.04)
IMM GRANULOCYTES NFR BLD AUTO: 0.7 %
LDLC SERPL CALC-MCNC: 98 MG/DL (ref 50–140)
LYMPHOCYTES # BLD AUTO: 5.01 X10(3)/MCL (ref 0.6–4.6)
LYMPHOCYTES NFR BLD AUTO: 39.5 %
MCH RBC QN AUTO: 31.9 PG (ref 27–31)
MCHC RBC AUTO-ENTMCNC: 32.3 G/DL (ref 33–36)
MCV RBC AUTO: 98.8 FL (ref 80–94)
MONOCYTES # BLD AUTO: 1.49 X10(3)/MCL (ref 0.1–1.3)
MONOCYTES NFR BLD AUTO: 11.7 %
NEUTROPHILS # BLD AUTO: 5.8 X10(3)/MCL (ref 2.1–9.2)
NEUTROPHILS NFR BLD AUTO: 45.7 %
NRBC BLD AUTO-RTO: 0 %
PLATELET # BLD AUTO: 458 X10(3)/MCL (ref 130–400)
PMV BLD AUTO: 10.6 FL (ref 7.4–10.4)
POTASSIUM SERPL-SCNC: 4.4 MMOL/L (ref 3.5–5.1)
PROT SERPL-MCNC: 6.4 GM/DL (ref 6.4–8.3)
RBC # BLD AUTO: 4.29 X10(6)/MCL (ref 4.2–5.4)
SODIUM SERPL-SCNC: 144 MMOL/L (ref 136–145)
TRIGL SERPL-MCNC: 160 MG/DL (ref 37–140)
TSH SERPL-ACNC: 0.85 UIU/ML (ref 0.35–4.94)
VLDLC SERPL CALC-MCNC: 32 MG/DL
WBC # BLD AUTO: 12.69 X10(3)/MCL (ref 4.5–11.5)

## 2025-02-05 PROCEDURE — 80061 LIPID PANEL: CPT | Performed by: INTERNAL MEDICINE

## 2025-02-05 PROCEDURE — 83036 HEMOGLOBIN GLYCOSYLATED A1C: CPT | Performed by: INTERNAL MEDICINE

## 2025-02-05 PROCEDURE — 84443 ASSAY THYROID STIM HORMONE: CPT | Performed by: INTERNAL MEDICINE

## 2025-02-05 PROCEDURE — 85025 COMPLETE CBC W/AUTO DIFF WBC: CPT | Performed by: INTERNAL MEDICINE

## 2025-02-05 PROCEDURE — 80053 COMPREHEN METABOLIC PANEL: CPT | Performed by: INTERNAL MEDICINE

## 2025-02-06 ENCOUNTER — LAB REQUISITION (OUTPATIENT)
Dept: LAB | Facility: HOSPITAL | Age: 45
End: 2025-02-06
Payer: MEDICARE

## 2025-02-06 DIAGNOSIS — I10 ESSENTIAL (PRIMARY) HYPERTENSION: ICD-10-CM

## 2025-02-06 LAB
BASOPHILS # BLD AUTO: 0.12 X10(3)/MCL
BASOPHILS NFR BLD AUTO: 0.9 %
EOSINOPHIL # BLD AUTO: 0.26 X10(3)/MCL (ref 0–0.9)
EOSINOPHIL NFR BLD AUTO: 2 %
ERYTHROCYTE [DISTWIDTH] IN BLOOD BY AUTOMATED COUNT: 12.9 % (ref 11.5–17)
HCT VFR BLD AUTO: 40.9 % (ref 37–47)
HGB BLD-MCNC: 13.3 G/DL (ref 12–16)
IMM GRANULOCYTES # BLD AUTO: 0.07 X10(3)/MCL (ref 0–0.04)
IMM GRANULOCYTES NFR BLD AUTO: 0.5 %
LYMPHOCYTES # BLD AUTO: 4.92 X10(3)/MCL (ref 0.6–4.6)
LYMPHOCYTES NFR BLD AUTO: 37.3 %
MCH RBC QN AUTO: 32 PG (ref 27–31)
MCHC RBC AUTO-ENTMCNC: 32.5 G/DL (ref 33–36)
MCV RBC AUTO: 98.6 FL (ref 80–94)
MONOCYTES # BLD AUTO: 1.45 X10(3)/MCL (ref 0.1–1.3)
MONOCYTES NFR BLD AUTO: 11 %
NEUTROPHILS # BLD AUTO: 6.38 X10(3)/MCL (ref 2.1–9.2)
NEUTROPHILS NFR BLD AUTO: 48.3 %
NRBC BLD AUTO-RTO: 0 %
PLATELET # BLD AUTO: 464 X10(3)/MCL (ref 130–400)
PMV BLD AUTO: 10.6 FL (ref 7.4–10.4)
RBC # BLD AUTO: 4.15 X10(6)/MCL (ref 4.2–5.4)
WBC # BLD AUTO: 13.2 X10(3)/MCL (ref 4.5–11.5)

## 2025-02-06 PROCEDURE — 85025 COMPLETE CBC W/AUTO DIFF WBC: CPT | Performed by: INTERNAL MEDICINE

## 2025-02-12 ENCOUNTER — LAB REQUISITION (OUTPATIENT)
Dept: LAB | Facility: HOSPITAL | Age: 45
End: 2025-02-12
Payer: MEDICARE

## 2025-02-12 DIAGNOSIS — D72.829 ELEVATED WHITE BLOOD CELL COUNT, UNSPECIFIED: ICD-10-CM

## 2025-02-12 LAB
BASOPHILS # BLD AUTO: 0.08 X10(3)/MCL
BASOPHILS NFR BLD AUTO: 0.7 %
EOSINOPHIL # BLD AUTO: 0.2 X10(3)/MCL (ref 0–0.9)
EOSINOPHIL NFR BLD AUTO: 1.8 %
ERYTHROCYTE [DISTWIDTH] IN BLOOD BY AUTOMATED COUNT: 13 % (ref 11.5–17)
HCT VFR BLD AUTO: 41.6 % (ref 37–47)
HGB BLD-MCNC: 13.7 G/DL (ref 12–16)
IMM GRANULOCYTES # BLD AUTO: 0.03 X10(3)/MCL (ref 0–0.04)
IMM GRANULOCYTES NFR BLD AUTO: 0.3 %
LYMPHOCYTES # BLD AUTO: 4.29 X10(3)/MCL (ref 0.6–4.6)
LYMPHOCYTES NFR BLD AUTO: 37.7 %
MCH RBC QN AUTO: 31.8 PG (ref 27–31)
MCHC RBC AUTO-ENTMCNC: 32.9 G/DL (ref 33–36)
MCV RBC AUTO: 96.5 FL (ref 80–94)
MONOCYTES # BLD AUTO: 1.13 X10(3)/MCL (ref 0.1–1.3)
MONOCYTES NFR BLD AUTO: 9.9 %
NEUTROPHILS # BLD AUTO: 5.64 X10(3)/MCL (ref 2.1–9.2)
NEUTROPHILS NFR BLD AUTO: 49.6 %
NRBC BLD AUTO-RTO: 0 %
PLATELET # BLD AUTO: 397 X10(3)/MCL (ref 130–400)
PMV BLD AUTO: 11.1 FL (ref 7.4–10.4)
RBC # BLD AUTO: 4.31 X10(6)/MCL (ref 4.2–5.4)
WBC # BLD AUTO: 11.37 X10(3)/MCL (ref 4.5–11.5)

## 2025-02-12 PROCEDURE — 85025 COMPLETE CBC W/AUTO DIFF WBC: CPT | Performed by: INTERNAL MEDICINE

## 2025-06-05 ENCOUNTER — HOSPITAL ENCOUNTER (OUTPATIENT)
Facility: HOSPITAL | Age: 45
End: 2025-06-07
Attending: STUDENT IN AN ORGANIZED HEALTH CARE EDUCATION/TRAINING PROGRAM | Admitting: INTERNAL MEDICINE
Payer: MEDICARE

## 2025-06-05 DIAGNOSIS — T85.528A DISLODGED GASTROSTOMY TUBE: Primary | ICD-10-CM

## 2025-06-05 PROBLEM — K94.23 PEG TUBE MALFUNCTION: Status: ACTIVE | Noted: 2025-06-05

## 2025-06-05 PROBLEM — R56.9 SEIZURE: Status: ACTIVE | Noted: 2025-06-05

## 2025-06-05 LAB
ALBUMIN SERPL-MCNC: 3.5 G/DL (ref 3.5–5)
ALBUMIN/GLOB SERPL: 1 RATIO (ref 1.1–2)
ALP SERPL-CCNC: 119 UNIT/L (ref 40–150)
ALT SERPL-CCNC: 24 UNIT/L (ref 0–55)
ANION GAP SERPL CALC-SCNC: 7 MEQ/L
AST SERPL-CCNC: 17 UNIT/L (ref 11–45)
BASOPHILS # BLD AUTO: 0.09 X10(3)/MCL
BASOPHILS NFR BLD AUTO: 1.1 %
BILIRUB SERPL-MCNC: 0.3 MG/DL
BUN SERPL-MCNC: 15.1 MG/DL (ref 7–18.7)
CALCIUM SERPL-MCNC: 9.2 MG/DL (ref 8.4–10.2)
CHLORIDE SERPL-SCNC: 107 MMOL/L (ref 98–107)
CO2 SERPL-SCNC: 26 MMOL/L (ref 22–29)
CREAT SERPL-MCNC: 0.63 MG/DL (ref 0.55–1.02)
CREAT/UREA NIT SERPL: 24
EOSINOPHIL # BLD AUTO: 0.22 X10(3)/MCL (ref 0–0.9)
EOSINOPHIL NFR BLD AUTO: 2.8 %
ERYTHROCYTE [DISTWIDTH] IN BLOOD BY AUTOMATED COUNT: 12.6 % (ref 11.5–17)
GFR SERPLBLD CREATININE-BSD FMLA CKD-EPI: >60 ML/MIN/1.73/M2
GLOBULIN SER-MCNC: 3.6 GM/DL (ref 2.4–3.5)
GLUCOSE SERPL-MCNC: 84 MG/DL (ref 74–100)
HCT VFR BLD AUTO: 42.3 % (ref 37–47)
HGB BLD-MCNC: 14.1 G/DL (ref 12–16)
IMM GRANULOCYTES # BLD AUTO: 0.02 X10(3)/MCL (ref 0–0.04)
IMM GRANULOCYTES NFR BLD AUTO: 0.3 %
LYMPHOCYTES # BLD AUTO: 3.17 X10(3)/MCL (ref 0.6–4.6)
LYMPHOCYTES NFR BLD AUTO: 39.7 %
MCH RBC QN AUTO: 32.3 PG (ref 27–31)
MCHC RBC AUTO-ENTMCNC: 33.3 G/DL (ref 33–36)
MCV RBC AUTO: 96.8 FL (ref 80–94)
MONOCYTES # BLD AUTO: 0.98 X10(3)/MCL (ref 0.1–1.3)
MONOCYTES NFR BLD AUTO: 12.3 %
NEUTROPHILS # BLD AUTO: 3.5 X10(3)/MCL (ref 2.1–9.2)
NEUTROPHILS NFR BLD AUTO: 43.8 %
NRBC BLD AUTO-RTO: 0 %
PLATELET # BLD AUTO: 371 X10(3)/MCL (ref 130–400)
PMV BLD AUTO: 10.6 FL (ref 7.4–10.4)
POTASSIUM SERPL-SCNC: 4 MMOL/L (ref 3.5–5.1)
PROT SERPL-MCNC: 7.1 GM/DL (ref 6.4–8.3)
RBC # BLD AUTO: 4.37 X10(6)/MCL (ref 4.2–5.4)
SODIUM SERPL-SCNC: 140 MMOL/L (ref 136–145)
WBC # BLD AUTO: 7.98 X10(3)/MCL (ref 4.5–11.5)

## 2025-06-05 PROCEDURE — G0378 HOSPITAL OBSERVATION PER HR: HCPCS

## 2025-06-05 PROCEDURE — 63600175 PHARM REV CODE 636 W HCPCS: Performed by: INTERNAL MEDICINE

## 2025-06-05 PROCEDURE — 80053 COMPREHEN METABOLIC PANEL: CPT | Performed by: STUDENT IN AN ORGANIZED HEALTH CARE EDUCATION/TRAINING PROGRAM

## 2025-06-05 PROCEDURE — 85025 COMPLETE CBC W/AUTO DIFF WBC: CPT | Performed by: STUDENT IN AN ORGANIZED HEALTH CARE EDUCATION/TRAINING PROGRAM

## 2025-06-05 PROCEDURE — 96361 HYDRATE IV INFUSION ADD-ON: CPT

## 2025-06-05 PROCEDURE — 99285 EMERGENCY DEPT VISIT HI MDM: CPT

## 2025-06-05 PROCEDURE — 96376 TX/PRO/DX INJ SAME DRUG ADON: CPT

## 2025-06-05 PROCEDURE — 63600175 PHARM REV CODE 636 W HCPCS: Performed by: STUDENT IN AN ORGANIZED HEALTH CARE EDUCATION/TRAINING PROGRAM

## 2025-06-05 RX ORDER — LEVETIRACETAM 500 MG/5ML
500 INJECTION, SOLUTION, CONCENTRATE INTRAVENOUS EVERY 12 HOURS
Status: DISCONTINUED | OUTPATIENT
Start: 2025-06-05 | End: 2025-06-06 | Stop reason: SDUPTHER

## 2025-06-05 RX ORDER — SODIUM CHLORIDE, SODIUM LACTATE, POTASSIUM CHLORIDE, CALCIUM CHLORIDE 600; 310; 30; 20 MG/100ML; MG/100ML; MG/100ML; MG/100ML
1000 INJECTION, SOLUTION INTRAVENOUS
Status: COMPLETED | OUTPATIENT
Start: 2025-06-05 | End: 2025-06-05

## 2025-06-05 RX ADMIN — LEVETIRACETAM 500 MG: 100 INJECTION, SOLUTION INTRAVENOUS at 08:06

## 2025-06-05 RX ADMIN — SODIUM CHLORIDE, POTASSIUM CHLORIDE, SODIUM LACTATE AND CALCIUM CHLORIDE 1000 ML: 600; 310; 30; 20 INJECTION, SOLUTION INTRAVENOUS at 10:06

## 2025-06-05 RX ADMIN — SODIUM CHLORIDE, POTASSIUM CHLORIDE, SODIUM LACTATE AND CALCIUM CHLORIDE 1000 ML: 600; 310; 30; 20 INJECTION, SOLUTION INTRAVENOUS at 12:06

## 2025-06-05 NOTE — ED PROVIDER NOTES
Encounter Date: 6/5/2025    SCRIBE #1 NOTE: I, Robles Rock, am scribing for, and in the presence of,  Mayo Mora MD. I have scribed the entire note.       History     Chief Complaint   Patient presents with    PEG Tube Problem     Sent from [facility]. NH reporting pt pulled PEG out around 0600 this morning.      44 year old female with a hx of DM, HTN, and quadriplegia presents to the ED from [facility] via EMS for PEG tube problem. Pt pulled out her PEG tube at around 0600 this morning. Pt is nonverbal at baseline. Pt's ROS is unobtainable.     The history is provided by the patient.     Review of patient's allergies indicates:  No Known Allergies  Past Medical History:   Diagnosis Date    Acquired absence of one eye     Anemia, unspecified     Cerebral palsy, unspecified     Constipation     Contracture of hand joint, left     Contracture of muscle, right hand     DM (diabetes mellitus)     Dry eye syndrome of lacrimal gland, unspecified laterality     HTN (hypertension)     Quadriplegia     TBI (traumatic brain injury)     Unspecified protein-calorie malnutrition     Vitamin D deficiency, unspecified      Past Surgical History:   Procedure Laterality Date    ESOPHAGOGASTRODUODENOSCOPY W/ PEG N/A 6/6/2025    Procedure: PEG;  Surgeon: Ayan Chaudhari MD;  Location: [facility] ENDOSCOPY;  Service: Endoscopy;  Laterality: N/A;    GASTROSTOMY TUBE PLACEMENT       Family History   Family history unknown: Yes     Social History[1]  Review of Systems   Unable to perform ROS: Patient nonverbal       Physical Exam     Initial Vitals [06/05/25 0858]   BP Pulse Resp Temp SpO2   122/84 (!) 57 16 96.7 °F (35.9 °C) 97 %      MAP       --         Physical Exam    Nursing note and vitals reviewed.  Constitutional: She is not diaphoretic. No distress.   HENT:   Head: Normocephalic and atraumatic.   Nose: Nose normal. Mouth/Throat: Oropharynx is clear and moist.   Eyes: EOM are normal. Pupils are  equal, round, and reactive to light.   Neck: Neck supple.   Normal range of motion.  Cardiovascular:  Normal rate and regular rhythm.           No murmur heard.  Pulmonary/Chest: Breath sounds normal. No respiratory distress. She has no wheezes. She has no rales.   Abdominal: Abdomen is soft. She exhibits no distension. There is no abdominal tenderness.   PEG stoma to the LUQ with granulation tissue    Musculoskeletal:      Cervical back: Normal range of motion and neck supple.     Neurological:   Contracted extremities. Nonverbal at baseline.    Skin: Skin is warm. Capillary refill takes less than 2 seconds. No rash noted.         ED Course   Procedures  Labs Reviewed   COMPREHENSIVE METABOLIC PANEL - Abnormal       Result Value    Sodium 140      Potassium 4.0      Chloride 107      CO2 26      Glucose 84      Blood Urea Nitrogen 15.1      Creatinine 0.63      Calcium 9.2      Protein Total 7.1      Albumin 3.5      Globulin 3.6 (*)     Albumin/Globulin Ratio 1.0 (*)     Bilirubin Total 0.3            ALT 24      AST 17      eGFR >60      Anion Gap 7.0      BUN/Creatinine Ratio 24     CBC WITH DIFFERENTIAL - Abnormal    WBC 7.98      RBC 4.37      Hgb 14.1      Hct 42.3      MCV 96.8 (*)     MCH 32.3 (*)     MCHC 33.3      RDW 12.6      Platelet 371      MPV 10.6 (*)     Neut % 43.8      Lymph % 39.7      Mono % 12.3      Eos % 2.8      Basophil % 1.1      Imm Grans % 0.3      Neut # 3.50      Lymph # 3.17      Mono # 0.98      Eos # 0.22      Baso # 0.09      Imm Gran # 0.02      NRBC% 0.0     CBC W/ AUTO DIFFERENTIAL    Narrative:     The following orders were created for panel order CBC auto differential.  Procedure                               Abnormality         Status                     ---------                               -----------         ------                     CBC with Differential[[id number]]       Abnormal            Final result                 Please view results for these tests on  the individual orders.          Imaging Results    None          Medications   ceFAZolin (Ancef) 1 gram injection (has no administration in time range)   propofol (DIPRIVAN) 10 mg/mL IVP injection (has no administration in time range)   LIDOcaine (PF) 10 mg/ml (1%) 10 mg/mL (1 %) injection (has no administration in time range)   lactated ringers bolus 1,000 mL (0 mLs Intravenous Stopped 6/5/25 1159)   lactated ringers infusion (1,000 mLs Intravenous New Bag 6/5/25 1218)     Medical Decision Making  Problems Addressed:  Dislodged gastrostomy tube: acute illness or injury    Amount and/or Complexity of Data Reviewed  Labs: ordered.    Risk  Prescription drug management.    Differential diagnosis (includes but is not limited to):   Dislodged gastrostomy tube, close stoma, dehydration, kidney injury    MDM Narrative  44-year-old female presents after accidentally dislodging her gastrostomy tube, reportedly sometime around 6:00 a.m. this morning.  Multiple attempts to replace performed with no success.  Stoma appears closed.  GI consulted, will plan for placement tomorrow.  Maintenance IV fluids ongoing.  Discussed with the hospitalist, will admit for observation until replacement can be performed.    Dispo: Admit    My independent radiology interpretation: as above  Point of care US (independently performed and interpreted):   Decision rules/clinical scoring:     Sepsis Perfusion Assessment:     Amount and/or Complexity of Data Reviewed  Independent historian: none   Summary of history:   External data reviewed: notes from previous ED visits and notes from clinic visits  Summary of data reviewed: Prior records reviewed  Risk and benefits of testing: discussed   Labs: ordered and reviewed  ECG/medicine tests: ordered and independent interpretation performed (see above or ED course)  Discussion of management or test interpretation with external provider(s): none   Summary of discussion:     Risk  Shared decision making      Critical Care  none    Data Reviewed/Counseling: I have personally reviewed the patient's vital signs, nursing notes, and other relevant tests, information, and imaging. I had a detailed discussion regarding the historical points, exam findings, and any diagnostic results supporting the discharge diagnosis. I personally performed the history, PE, MDM and procedures as documented above and agree with the scribe's documentation.    Portions of this note were dictated using voice recognition software. Although it was reviewed for accuracy, some inherent voice recognition errors may have occurred and may be present in this document.          Scribe Attestation:   Scribe #1: I performed the above scribed service and the documentation accurately describes the services I performed. I attest to the accuracy of the note.    Attending Attestation:           Physician Attestation for Scribe:  Physician Attestation Statement for Scribe #1: I, Mayo Mora MD, reviewed documentation, as scribed by Robles Rock in my presence, and it is both accurate and complete.             ED Course as of 06/09/25 1051   Thu Jun 05, 2025   1011 PEG tube replacement unsuccessful, Gastroenterology paged to discuss case. []   1115 Brittanie [MC]      ED Course User Index  [MC] Mayo Mora MD                           Clinical Impression:  Final diagnoses:  [T85.528A] Dislodged gastrostomy tube (Primary)          ED Disposition Condition    Observation                       [1]   Social History  Tobacco Use    Smoking status: Unknown   Substance Use Topics    Alcohol use: Not Currently        Mayo Mora MD  06/09/25 1057

## 2025-06-05 NOTE — PLAN OF CARE
[facility] Resident.    06/05/25 1344   Discharge Assessment   Assessment Type Discharge Planning Assessment   Confirmed/corrected address, phone number and insurance Yes   Confirmed Demographics Correct on Facesheet   Source of Information facility verbal report  (Emilee at [facility])   Communicated JOHN with patient/caregiver Date not available/Unable to determine   People in Home facility resident   Facility Arrived From: [facility]   Do you expect to return to your current living situation? Yes   Do you have help at home or someone to help you manage your care at home? Yes   Who are your caregiver(s) and their phone number(s)? facility resident. Mother Sofie Alfonso [phone]   Prior to hospitilization cognitive status: Unable to Assess   Current cognitive status: Unable to Assess   Walking or Climbing Stairs Difficulty yes   Walking or Climbing Stairs ambulation difficulty, requires equipment;stair climbing difficulty, dependent;transferring difficulty, dependent;ambulation difficulty, dependent   Mobility Management gilda lift, wheelchair chair   Dressing/Bathing Difficulty yes   Dressing/Bathing bathing difficulty, dependent;dressing difficulty, dependent;bathing difficulty, requires equipment   Dressing/Bathing Management Staff assists with all ADLS   Home Accessibility wheelchair accessible   Home Layout Able to live on 1st floor   Equipment Currently Used at Home other (see comments);wheelchair;lift device;hospital bed  (PEG)   Readmission within 30 days? No   Patient currently being followed by outpatient case management? No   Do you currently have service(s) that help you manage your care at home? No   Do you take prescription medications? Yes   Do you have prescription coverage? Yes  (Medicare)   Do you have any problems affording any of your prescribed medications? No   Is the patient taking medications as prescribed? yes   Who is going to help you get home at discharge? ambulance   How do  you get to doctors appointments? agency   Are you on dialysis? No   Do you take coumadin? No   Discharge Plan A Return to nursing home   DME Needed Upon Discharge  none   Discharge Plan discussed with:   (facility staff Emilee)   Transition of Care Barriers None

## 2025-06-05 NOTE — SUBJECTIVE & OBJECTIVE
Past Medical History:   Diagnosis Date    Acquired absence of one eye     Anemia, unspecified     Cerebral palsy, unspecified     Constipation     Contracture of hand joint, left     Contracture of muscle, right hand     DM (diabetes mellitus)     Dry eye syndrome of lacrimal gland, unspecified laterality     HTN (hypertension)     Quadriplegia     TBI (traumatic brain injury)     Unspecified protein-calorie malnutrition     Vitamin D deficiency, unspecified        Past Surgical History:   Procedure Laterality Date    GASTROSTOMY TUBE PLACEMENT         Review of patient's allergies indicates:  No Known Allergies    No current facility-administered medications on file prior to encounter.     Current Outpatient Medications on File Prior to Encounter   Medication Sig    acetaminophen (TYLENOL) 325 MG tablet Take 325 mg by mouth every 6 (six) hours as needed for Pain.    aspirin 81 MG Chew Take 81 mg by mouth once daily.    baclofen (LIORESAL) 20 MG tablet Take 20 mg by mouth 3 (three) times daily.    docusate (COLACE) 50 mg/5 mL liquid Take 100 mg by mouth 2 (two) times daily.    levETIRAcetam (KEPPRA) 100 mg/mL Soln Take 20 mg/kg by mouth 2 (two) times daily.    polyethylene glycol (GLYCOLAX) 17 gram/dose powder Take 17 g by mouth once daily.     Family History    Family history is unknown by patient.       Tobacco Use    Smoking status: Unknown    Smokeless tobacco: Not on file   Substance and Sexual Activity    Alcohol use: Not Currently    Drug use: Not on file    Sexual activity: Not on file     Review of Systems   Unable to perform ROS: Patient nonverbal     Objective:     Vital Signs (Most Recent):  Temp: 98.2 °F (36.8 °C) (06/05/25 1606)  Pulse: (!) 54 (06/05/25 1606)  Resp: 16 (06/05/25 0858)  BP: 110/68 (06/05/25 1606)  SpO2: (!) 94 % (06/05/25 1606) Vital Signs (24h Range):  Temp:  [96.7 °F (35.9 °C)-98.2 °F (36.8 °C)] 98.2 °F (36.8 °C)  Pulse:  [54-96] 54  Resp:  [16] 16  SpO2:  [94 %-98 %] 94 %  BP:  "(104-122)/(55-84) 110/68     Weight: 59 kg (130 lb)  Body mass index is 20.98 kg/m².     Physical Exam  Constitutional:       General: She is awake.      Appearance: Normal appearance. She is normal weight.   HENT:      Head: Normocephalic and atraumatic.      Nose: Nose normal.      Mouth/Throat:      Mouth: Mucous membranes are dry.      Pharynx: Oropharynx is clear.   Eyes:      Extraocular Movements: Extraocular movements intact.      Comments: Right eye is absent   Cardiovascular:      Rate and Rhythm: Normal rate and regular rhythm.      Pulses: Normal pulses.      Heart sounds: Normal heart sounds.   Pulmonary:      Effort: Pulmonary effort is normal.      Breath sounds: Normal breath sounds.   Abdominal:      General: Bowel sounds are normal.      Palpations: Abdomen is soft.   Musculoskeletal:         General: Normal range of motion.      Cervical back: Normal range of motion and neck supple.   Skin:     General: Skin is warm and dry.   Neurological:      Mental Status: Mental status is at baseline. She is disoriented.   Psychiatric:         Speech: She is noncommunicative.         Cognition and Memory: Cognition is impaired. Memory is impaired. She exhibits impaired recent memory and impaired remote memory.              CRANIAL NERVES        Patient is not able to follow commands        Significant Labs: All pertinent labs within the past 24 hours have been reviewed.  BMP:   Recent Labs   Lab 06/05/25  1057   GLU 84      K 4.0      CO2 26   BUN 15.1   CREATININE 0.63   CALCIUM 9.2     CBC:   Recent Labs   Lab 06/05/25  1057   WBC 7.98   HGB 14.1   HCT 42.3        CMP:   Recent Labs   Lab 06/05/25  1057      K 4.0      CO2 26   GLU 84   BUN 15.1   CREATININE 0.63   CALCIUM 9.2   PROT 7.1   ALBUMIN 3.5   BILITOT 0.3   ALKPHOS 119   AST 17   ALT 24     Magnesium: No results for input(s): "MG" in the last 48 hours.    Significant Imaging: I have reviewed all pertinent imaging " results/findings within the past 24 hours.

## 2025-06-05 NOTE — CONSULTS
Consult Note    Reason for Consult:      We were consulted to evaluate this patient for dislodged PEG tube.     HPI:   44 year old female known to Dr. Jayce Mcdowell in the remote past with PMH of DM, HTN, quadriplegia, and oropharyngeal dysphagia s/p PEG placement 2011 presented to the ED today from [facility] via EMS for PEG tube dislodgment that occurred around 7:00 a.m..      History obtained from chart review d/t patient being nonverbal. ED physician unable to replace PEG tube at bedside due to small size of stoma.  Given this, GI consulted peg tube replacement.    Per our records, PEG was most recently replaced at bedside in ED 01/2025 due to pt pulling it. Replaced with 14 Fr. Prior to that, pt had an 18 Fr PEG tube.     PEG was replaced endoscopically with tract dilation in 2017 as below.    On 81 mg aspirin.    Previous records reviewed...  01/18/2017 EGD for PEG exchange:  No gross lesions in esophagus.  Intact 14 Egyptian gastrostomy with patent G-tube present.  Site was tight and required dilation to 18 Egyptian then 24 Egyptian with Savary dilators. Replaced with externally removable 20 Egyptian PEG.    PCP:  No, Primary Doctor    Review of patient's allergies indicates:  No Known Allergies     Current Medications[1]  Prescriptions Prior to Admission[2]    Past Medical History:  Past Medical History:   Diagnosis Date    DM (diabetes mellitus)     HTN (hypertension)     Quadriplegia       Past Surgical History:  Past Surgical History:   Procedure Laterality Date    GASTROSTOMY TUBE PLACEMENT        Family History:  No family history on file.  Social History:  Social History     Tobacco Use    Smoking status: Not on file    Smokeless tobacco: Not on file   Substance Use Topics    Alcohol use: Not on file       Review of Systems:     Review of Systems   Unable to perform ROS: Patient nonverbal       Objective:     VITAL SIGNS: 24 HR MIN & MAX LAST    Temp  Min: 96.7 °F (35.9 °C)  Max: 96.7 °F (35.9 °C)   96.7 °F (35.9 °C)        BP  Min: 104/71  Max: 122/84  104/71     Pulse  Min: 57  Max: 72  72     Resp  Min: 16  Max: 16  16    SpO2  Min: 95 %  Max: 97 %  95 %      No intake or output data in the 24 hours ending 06/05/25 1157    Physical Exam  Constitutional:       General: She is not in acute distress.  Cardiovascular:      Rate and Rhythm: Normal rate and regular rhythm.   Pulmonary:      Effort: Pulmonary effort is normal. No respiratory distress.   Abdominal:      General: Bowel sounds are normal. There is no distension.      Palpations: Abdomen is soft. There is no mass.      Tenderness: There is no abdominal tenderness. There is no guarding or rebound.      Comments: PEG stoma present in LUQ with granulation tissue   Musculoskeletal:      Comments: Contracted extremities   Skin:     General: Skin is warm and dry.      Coloration: Skin is not jaundiced.   Neurological:      Mental Status: She is alert. Mental status is at baseline.           Recent Results (from the past 48 hours)   Comprehensive metabolic panel    Collection Time: 06/05/25 10:57 AM   Result Value Ref Range    Sodium 140 136 - 145 mmol/L    Potassium 4.0 3.5 - 5.1 mmol/L    Chloride 107 98 - 107 mmol/L    CO2 26 22 - 29 mmol/L    Glucose 84 74 - 100 mg/dL    Blood Urea Nitrogen 15.1 7.0 - 18.7 mg/dL    Creatinine 0.63 0.55 - 1.02 mg/dL    Calcium 9.2 8.4 - 10.2 mg/dL    Protein Total 7.1 6.4 - 8.3 gm/dL    Albumin 3.5 3.5 - 5.0 g/dL    Globulin 3.6 (H) 2.4 - 3.5 gm/dL    Albumin/Globulin Ratio 1.0 (L) 1.1 - 2.0 ratio    Bilirubin Total 0.3 <=1.5 mg/dL     40 - 150 unit/L    ALT 24 0 - 55 unit/L    AST 17 11 - 45 unit/L    eGFR >60 mL/min/1.73/m2    Anion Gap 7.0 mEq/L    BUN/Creatinine Ratio 24    CBC with Differential    Collection Time: 06/05/25 10:57 AM   Result Value Ref Range    WBC 7.98 4.50 - 11.50 x10(3)/mcL    RBC 4.37 4.20 - 5.40 x10(6)/mcL    Hgb 14.1 12.0 - 16.0 g/dL    Hct 42.3 37.0 - 47.0 %    MCV 96.8 (H) 80.0 - 94.0 fL     MCH 32.3 (H) 27.0 - 31.0 pg    MCHC 33.3 33.0 - 36.0 g/dL    RDW 12.6 11.5 - 17.0 %    Platelet 371 130 - 400 x10(3)/mcL    MPV 10.6 (H) 7.4 - 10.4 fL    Neut % 43.8 %    Lymph % 39.7 %    Mono % 12.3 %    Eos % 2.8 %    Basophil % 1.1 %    Imm Grans % 0.3 %    Neut # 3.50 2.1 - 9.2 x10(3)/mcL    Lymph # 3.17 0.6 - 4.6 x10(3)/mcL    Mono # 0.98 0.1 - 1.3 x10(3)/mcL    Eos # 0.22 0 - 0.9 x10(3)/mcL    Baso # 0.09 <=0.2 x10(3)/mcL    Imm Gran # 0.02 0.00 - 0.04 x10(3)/mcL    NRBC% 0.0 %       No results found.    Imaging personally reviewed by myself and SP.    Assessment / Plan:   44 year old female known to Dr. Jayce Mcdowell in the remote past with PMH of DM, HTN, quadriplegia, and oropharyngeal dysphagia s/p PEG placement 2011 presented to the ED today from [facility] via EMS for PEG tube dislodgment. ED physician unable to replace PEG tube at bedside due to small size of stoma.  Given this, GI consulted peg tube replacement.    1. Oropharyngeal dysphagia   S/p original PEG placement 2011    - Discussed EGD/PEG with patient's mother Sofie Alfonso on the phone in detail including R/B/A.  Wishes to proceed.    - Keep NPO. Will perform EGD/PEG tomorrow d/t schedule availability.  - Hold AM ASA   - Will need to keep abdominal binder in place at all times to prevent dislodgement.     Thank you for allowing us to participate in this patient's care.         [1]   Current Facility-Administered Medications   Medication Dose Route Frequency Provider Last Rate Last Admin    lactated ringers bolus 1,000 mL  1,000 mL Intravenous ED 1 Time Mayo Mora  mL/hr at 06/05/25 1059 1,000 mL at 06/05/25 1059    lactated ringers infusion  1,000 mL Intravenous ED 1 Time Mayo Mora MD         Current Outpatient Medications   Medication Sig Dispense Refill    acetaminophen (TYLENOL) 325 MG tablet Take 325 mg by mouth every 6 (six) hours as needed for Pain.      aspirin 81 MG Chew Take 81 mg by mouth once  daily.      baclofen (LIORESAL) 20 MG tablet Take 20 mg by mouth 3 (three) times daily.      docusate (COLACE) 50 mg/5 mL liquid Take 50 mg by mouth once daily.      levETIRAcetam (KEPPRA) 100 mg/mL Soln Take 20 mg/kg by mouth 2 (two) times daily.      polyethylene glycol (GLYCOLAX) 17 gram/dose powder Take 17 g by mouth once daily.     [2] (Not in a hospital admission)

## 2025-06-05 NOTE — HPI
45 yo female presents from a local NH due to dislodgement of her PEG.  This has occurred in the past as well.  The ED was not able to replace the tube in the ED so she is being admitted to observation for GI to replace.  GI is not able to do it today due to OR availability.  Patient is nonverbal and I am unable to get any information from the patient .  The mother is not at bedside.  GI did speak to mother over the phone and they will proceed with EGD and PEG placement tomorrow.  Currently no N/V/D/C.  No fever/chills.

## 2025-06-05 NOTE — H&P
[facility] - 8th Floor [facility] Medicine  History & Physical    Patient Name: Niles Alfonso  MRN: [id number]  Patient Class: OP- Observation  Admission Date: 6/5/2025  Attending Physician: Stevie Gu MD   Primary Care Provider: Narda Primary Doctor         Patient information was obtained from past medical records and ER records.     Subjective:     Principal Problem:PEG tube malfunction    Chief Complaint:   Chief Complaint   Patient presents with    PEG Tube Problem     Sent from [facility]. NH reporting pt pulled PEG out around 0600 this morning.         HPI: 45 yo female presents from a local NH due to dislodgement of her PEG.  This has occurred in the past as well.  The ED was not able to replace the tube in the ED so she is being admitted to observation for GI to replace.  GI is not able to do it today due to OR availability.  Patient is nonverbal and I am unable to get any information from the patient .  The mother is not at bedside.  GI did speak to mother over the phone and they will proceed with EGD and PEG placement tomorrow.  Currently no N/V/D/C.  No fever/chills.      Past Medical History:   Diagnosis Date    Acquired absence of one eye     Anemia, unspecified     Cerebral palsy, unspecified     Constipation     Contracture of hand joint, left     Contracture of muscle, right hand     DM (diabetes mellitus)     Dry eye syndrome of lacrimal gland, unspecified laterality     HTN (hypertension)     Quadriplegia     TBI (traumatic brain injury)     Unspecified protein-calorie malnutrition     Vitamin D deficiency, unspecified        Past Surgical History:   Procedure Laterality Date    GASTROSTOMY TUBE PLACEMENT         Review of patient's allergies indicates:  No Known Allergies    No current facility-administered medications on file prior to encounter.     Current Outpatient Medications on File Prior to Encounter   Medication Sig    acetaminophen  (TYLENOL) 325 MG tablet Take 325 mg by mouth every 6 (six) hours as needed for Pain.    aspirin 81 MG Chew Take 81 mg by mouth once daily.    baclofen (LIORESAL) 20 MG tablet Take 20 mg by mouth 3 (three) times daily.    docusate (COLACE) 50 mg/5 mL liquid Take 100 mg by mouth 2 (two) times daily.    levETIRAcetam (KEPPRA) 100 mg/mL Soln Take 20 mg/kg by mouth 2 (two) times daily.    polyethylene glycol (GLYCOLAX) 17 gram/dose powder Take 17 g by mouth once daily.     Family History    Family history is unknown by patient.       Tobacco Use    Smoking status: Unknown    Smokeless tobacco: Not on file   Substance and Sexual Activity    Alcohol use: Not Currently    Drug use: Not on file    Sexual activity: Not on file     Review of Systems   Unable to perform ROS: Patient nonverbal     Objective:     Vital Signs (Most Recent):  Temp: 98.2 °F (36.8 °C) (06/05/25 1606)  Pulse: (!) 54 (06/05/25 1606)  Resp: 16 (06/05/25 0858)  BP: 110/68 (06/05/25 1606)  SpO2: (!) 94 % (06/05/25 1606) Vital Signs (24h Range):  Temp:  [96.7 °F (35.9 °C)-98.2 °F (36.8 °C)] 98.2 °F (36.8 °C)  Pulse:  [54-96] 54  Resp:  [16] 16  SpO2:  [94 %-98 %] 94 %  BP: (104-122)/(55-84) 110/68     Weight: 59 kg (130 lb)  Body mass index is 20.98 kg/m².     Physical Exam  Constitutional:       General: She is awake.      Appearance: Normal appearance. She is normal weight.   HENT:      Head: Normocephalic and atraumatic.      Nose: Nose normal.      Mouth/Throat:      Mouth: Mucous membranes are dry.      Pharynx: Oropharynx is clear.   Eyes:      Extraocular Movements: Extraocular movements intact.      Comments: Right eye is absent   Cardiovascular:      Rate and Rhythm: Normal rate and regular rhythm.      Pulses: Normal pulses.      Heart sounds: Normal heart sounds.   Pulmonary:      Effort: Pulmonary effort is normal.      Breath sounds: Normal breath sounds.   Abdominal:      General: Bowel sounds are normal.      Palpations: Abdomen is soft.  "  Musculoskeletal:         General: Normal range of motion.      Cervical back: Normal range of motion and neck supple.   Skin:     General: Skin is warm and dry.   Neurological:      Mental Status: Mental status is at baseline. She is disoriented.   Psychiatric:         Speech: She is noncommunicative.         Cognition and Memory: Cognition is impaired. Memory is impaired. She exhibits impaired recent memory and impaired remote memory.              CRANIAL NERVES        Patient is not able to follow commands        Significant Labs: All pertinent labs within the past 24 hours have been reviewed.  BMP:   Recent Labs   Lab 06/05/25  1057   GLU 84      K 4.0      CO2 26   BUN 15.1   CREATININE 0.63   CALCIUM 9.2     CBC:   Recent Labs   Lab 06/05/25  1057   WBC 7.98   HGB 14.1   HCT 42.3        CMP:   Recent Labs   Lab 06/05/25  1057      K 4.0      CO2 26   GLU 84   BUN 15.1   CREATININE 0.63   CALCIUM 9.2   PROT 7.1   ALBUMIN 3.5   BILITOT 0.3   ALKPHOS 119   AST 17   ALT 24     Magnesium: No results for input(s): "MG" in the last 48 hours.    Significant Imaging: I have reviewed all pertinent imaging results/findings within the past 24 hours.  Assessment/Plan:     Assessment & Plan  PEG tube malfunction  Admit to obs  GI consulted for replacement of PEG    Seizure  Will switch keppra to IV for now    VTE Risk Mitigation (From admission, onward)      None              Patient screened for food insecurity, housing instability, transportation needs, utility difficulties, and interpersonal safety.   No needs, will return to NH    On 06/05/2025, patient should be placed in hospital observation services under my care.             Stevie Gu MD  Department of Hospital Medicine  [facility] - 8th Floor Med Surg          "

## 2025-06-06 ENCOUNTER — ANESTHESIA (OUTPATIENT)
Dept: ENDOSCOPY | Facility: HOSPITAL | Age: 45
End: 2025-06-06
Payer: MEDICARE

## 2025-06-06 ENCOUNTER — ANESTHESIA EVENT (OUTPATIENT)
Dept: ENDOSCOPY | Facility: HOSPITAL | Age: 45
End: 2025-06-06
Payer: MEDICARE

## 2025-06-06 PROBLEM — E43 SEVERE MALNUTRITION: Status: ACTIVE | Noted: 2025-06-06

## 2025-06-06 LAB
INR PPP: 0.9
PROTHROMBIN TIME: 12.7 SECONDS (ref 12.5–14.5)

## 2025-06-06 PROCEDURE — 36415 COLL VENOUS BLD VENIPUNCTURE: CPT

## 2025-06-06 PROCEDURE — 43246 EGD PLACE GASTROSTOMY TUBE: CPT | Performed by: INTERNAL MEDICINE

## 2025-06-06 PROCEDURE — 85610 PROTHROMBIN TIME: CPT

## 2025-06-06 PROCEDURE — G0378 HOSPITAL OBSERVATION PER HR: HCPCS

## 2025-06-06 PROCEDURE — 63600175 PHARM REV CODE 636 W HCPCS: Performed by: NURSE ANESTHETIST, CERTIFIED REGISTERED

## 2025-06-06 PROCEDURE — 96376 TX/PRO/DX INJ SAME DRUG ADON: CPT

## 2025-06-06 PROCEDURE — 25000003 PHARM REV CODE 250: Performed by: NURSE ANESTHETIST, CERTIFIED REGISTERED

## 2025-06-06 PROCEDURE — 63600175 PHARM REV CODE 636 W HCPCS: Performed by: STUDENT IN AN ORGANIZED HEALTH CARE EDUCATION/TRAINING PROGRAM

## 2025-06-06 PROCEDURE — 37000008 HC ANESTHESIA 1ST 15 MINUTES: Performed by: INTERNAL MEDICINE

## 2025-06-06 PROCEDURE — D9220A PRA ANESTHESIA: Mod: ANES,,, | Performed by: ANESTHESIOLOGY

## 2025-06-06 PROCEDURE — 37000009 HC ANESTHESIA EA ADD 15 MINS: Performed by: INTERNAL MEDICINE

## 2025-06-06 PROCEDURE — D9220A PRA ANESTHESIA: Mod: CRNA,,, | Performed by: NURSE ANESTHETIST, CERTIFIED REGISTERED

## 2025-06-06 PROCEDURE — 25000003 PHARM REV CODE 250: Performed by: STUDENT IN AN ORGANIZED HEALTH CARE EDUCATION/TRAINING PROGRAM

## 2025-06-06 PROCEDURE — 96365 THER/PROPH/DIAG IV INF INIT: CPT

## 2025-06-06 PROCEDURE — 63600175 PHARM REV CODE 636 W HCPCS: Performed by: INTERNAL MEDICINE

## 2025-06-06 RX ORDER — SODIUM CHLORIDE, SODIUM GLUCONATE, SODIUM ACETATE, POTASSIUM CHLORIDE AND MAGNESIUM CHLORIDE 30; 37; 368; 526; 502 MG/100ML; MG/100ML; MG/100ML; MG/100ML; MG/100ML
INJECTION, SOLUTION INTRAVENOUS CONTINUOUS
OUTPATIENT
Start: 2025-06-06 | End: 2025-07-06

## 2025-06-06 RX ORDER — PROPOFOL 10 MG/ML
INJECTION, EMULSION INTRAVENOUS
Status: DISCONTINUED | OUTPATIENT
Start: 2025-06-06 | End: 2025-06-06

## 2025-06-06 RX ORDER — LIDOCAINE HYDROCHLORIDE 10 MG/ML
INJECTION, SOLUTION EPIDURAL; INFILTRATION; INTRACAUDAL; PERINEURAL
Status: DISCONTINUED
Start: 2025-06-06 | End: 2025-06-06 | Stop reason: WASHOUT

## 2025-06-06 RX ORDER — LIDOCAINE HYDROCHLORIDE 10 MG/ML
INJECTION, SOLUTION EPIDURAL; INFILTRATION; INTRACAUDAL; PERINEURAL
Status: DISPENSED
Start: 2025-06-06 | End: 2025-06-07

## 2025-06-06 RX ORDER — CEFAZOLIN SODIUM 1 G/3ML
INJECTION, POWDER, FOR SOLUTION INTRAMUSCULAR; INTRAVENOUS
Status: DISPENSED
Start: 2025-06-06 | End: 2025-06-07

## 2025-06-06 RX ORDER — CEFAZOLIN SODIUM 1 G/3ML
INJECTION, POWDER, FOR SOLUTION INTRAMUSCULAR; INTRAVENOUS
Status: DISCONTINUED | OUTPATIENT
Start: 2025-06-06 | End: 2025-06-06

## 2025-06-06 RX ORDER — SODIUM CHLORIDE, SODIUM LACTATE, POTASSIUM CHLORIDE, CALCIUM CHLORIDE 600; 310; 30; 20 MG/100ML; MG/100ML; MG/100ML; MG/100ML
INJECTION, SOLUTION INTRAVENOUS CONTINUOUS
OUTPATIENT
Start: 2025-06-06

## 2025-06-06 RX ORDER — PROPOFOL 10 MG/ML
VIAL (ML) INTRAVENOUS
Status: DISCONTINUED
Start: 2025-06-06 | End: 2025-06-06 | Stop reason: WASHOUT

## 2025-06-06 RX ORDER — ONDANSETRON HYDROCHLORIDE 2 MG/ML
4 INJECTION, SOLUTION INTRAVENOUS DAILY PRN
OUTPATIENT
Start: 2025-06-06

## 2025-06-06 RX ORDER — LIDOCAINE HYDROCHLORIDE 20 MG/ML
INJECTION INTRAVENOUS
Status: DISCONTINUED | OUTPATIENT
Start: 2025-06-06 | End: 2025-06-06

## 2025-06-06 RX ORDER — PROPOFOL 10 MG/ML
VIAL (ML) INTRAVENOUS
Status: DISPENSED
Start: 2025-06-06 | End: 2025-06-07

## 2025-06-06 RX ADMIN — PROPOFOL 20 MG: 10 INJECTION, EMULSION INTRAVENOUS at 01:06

## 2025-06-06 RX ADMIN — LIDOCAINE HYDROCHLORIDE 50 MG: 20 INJECTION INTRAVENOUS at 01:06

## 2025-06-06 RX ADMIN — CEFAZOLIN 1 G: 330 INJECTION, POWDER, FOR SOLUTION INTRAMUSCULAR; INTRAVENOUS at 01:06

## 2025-06-06 RX ADMIN — PROPOFOL 10 MG: 10 INJECTION, EMULSION INTRAVENOUS at 01:06

## 2025-06-06 RX ADMIN — LEVETIRACETAM 500 MG: 100 INJECTION, SOLUTION INTRAVENOUS at 09:06

## 2025-06-06 RX ADMIN — PROPOFOL 80 MG: 10 INJECTION, EMULSION INTRAVENOUS at 01:06

## 2025-06-06 RX ADMIN — SODIUM CHLORIDE, SODIUM GLUCONATE, SODIUM ACETATE, POTASSIUM CHLORIDE AND MAGNESIUM CHLORIDE: 526; 502; 368; 37; 30 INJECTION, SOLUTION INTRAVENOUS at 01:06

## 2025-06-06 NOTE — PROGRESS NOTES
[facility] - 8th Floor [facility] Medicine  Progress Note    Patient Name: Niles Alfonso  MRN: [id number]  Patient Class: OP- Observation   Admission Date: 6/5/2025  Length of Stay: 0 days  Attending Physician: Stevie Gu MD  Primary Care Provider: Narda, Primary Doctor        Subjective     Principal Problem:PEG tube malfunction        HPI:  43 yo female presents from a local NH due to dislodgement of her PEG.  This has occurred in the past as well.  The ED was not able to replace the tube in the ED so she is being admitted to observation for GI to replace.  GI is not able to do it today due to OR availability.  Patient is nonverbal and I am unable to get any information from the patient .  The mother is not at bedside.  GI did speak to mother over the phone and they will proceed with EGD and PEG placement tomorrow.  Currently no N/V/D/C.  No fever/chills.      Overview/Hospital Course:  6/6/25-Patient had her PEG placed.  She is stable and appears to be ready for discharge back top NH.  Exam(awake, NAD, RRR, CTA, BS +, NTTP)    6/6/25-Transportation was not available but will be available in the am.      Interval History:     Review of Systems   Unable to perform ROS: Patient nonverbal     Objective:     Vital Signs (Most Recent):  Temp: 98 °F (36.7 °C) (06/06/25 1618)  Pulse: 75 (06/06/25 1618)  Resp: 17 (06/06/25 1416)  BP: 108/66 (06/06/25 1618)  SpO2: (!) 93 % (06/06/25 1618) Vital Signs (24h Range):  Temp:  [96.6 °F (35.9 °C)-98 °F (36.7 °C)] 98 °F (36.7 °C)  Pulse:  [52-96] 75  Resp:  [13-18] 17  SpO2:  [93 %-100 %] 93 %  BP: ()/() 108/66     Weight: 59 kg (130 lb)  Body mass index is 20.98 kg/m².    Intake/Output Summary (Last 24 hours) at 6/6/2025 1853  Last data filed at 6/6/2025 1334  Gross per 24 hour   Intake 300 ml   Output --   Net 300 ml         Physical Exam  Constitutional:       General: She is awake.      Appearance: She is normal weight.   HENT:       "Head: Atraumatic.      Nose: Nose normal.      Mouth/Throat:      Mouth: Mucous membranes are moist.      Pharynx: Oropharynx is clear.   Eyes:      Extraocular Movements: Extraocular movements intact.      Conjunctiva/sclera: Conjunctivae normal.      Pupils: Pupils are equal, round, and reactive to light.      Comments: Right eye is absent   Cardiovascular:      Rate and Rhythm: Normal rate and regular rhythm.   Abdominal:      General: Bowel sounds are normal.      Palpations: Abdomen is soft.      Comments: PEG   Musculoskeletal:      Cervical back: Normal range of motion and neck supple.      Comments: atrophy   Skin:     General: Skin is warm and dry.      Capillary Refill: Capillary refill takes 2 to 3 seconds.   Neurological:      Mental Status: Mental status is at baseline. She is disoriented.   Psychiatric:         Speech: She is noncommunicative.         Cognition and Memory: Cognition is impaired. Memory is impaired. She exhibits impaired recent memory and impaired remote memory.               Significant Labs: All pertinent labs within the past 24 hours have been reviewed.  CBC:   Recent Labs   Lab 06/05/25  1057   WBC 7.98   HGB 14.1   HCT 42.3        CMP:   Recent Labs   Lab 06/05/25  1057      K 4.0      CO2 26   GLU 84   BUN 15.1   CREATININE 0.63   CALCIUM 9.2   PROT 7.1   ALBUMIN 3.5   BILITOT 0.3   ALKPHOS 119   AST 17   ALT 24     Magnesium: No results for input(s): "MG" in the last 48 hours.    Significant Imaging: I have reviewed all pertinent imaging results/findings within the past 24 hours.      Assessment & Plan  PEG tube malfunction  Admit to obs  GI consulted for replacement of PEG    Seizure  Will switch keppra to IV for now    Severe malnutrition  Nutrition consulted. Most recent weight and BMI monitored-     Measurements:  Wt Readings from Last 1 Encounters:   06/06/25 59 kg (130 lb)   Body mass index is 20.98 kg/m².    Patient has been screened and assessed by " RD.    Malnutrition Type:  Context: chronic illness  Level: severe    Malnutrition Characteristic Summary:  Weight Loss (Malnutrition): other (see comments) (Unable to assess)  Energy Intake (Malnutrition): other (see comments) (Unable to assess)  Subcutaneous Fat (Malnutrition): severe depletion  Muscle Mass (Malnutrition): severe depletion    Interventions/Recommendations (treatment strategy):  Enteral nutrition    VTE Risk Mitigation (From admission, onward)      None        Discharge to NH in am    Discharge Planning   JOHN: 6/7/2025     Code Status: Not on file   Medical Readiness for Discharge Date: 6/6/2025  Discharge Plan A: Return to nursing home                        Stevie Gu MD  Department of Hospital Medicine   [facility] - 8th Floor Med Surg

## 2025-06-06 NOTE — PROVATION PATIENT INSTRUCTIONS
Discharge Summary/Instructions after an Endoscopic Procedure  Patient Name: Niles Alfonso  Patient MRN: [id number]  Patient YOB: 1980  Friday, June 6, 2025  Ayan Chaudhari MD  Dear patient,  As a result of recent federal legislation (The Federal Cures Act), you may   receive lab or pathology results from your procedure in your [facility]   account before your physician is able to contact you. Your physician or   their representative will relay the results to you with their   recommendations at their soonest availability.  Thank you,  RESTRICTIONS:  During your procedure today, you received medications for sedation.  These   medications may affect your judgment, balance and coordination.  Therefore,   for 24 hours, you have the following restrictions:   - DO NOT drive a car, operate machinery, make legal/financial decisions,   sign important papers or drink alcohol.    ACTIVITY:  Today: no heavy lifting, straining or running due to procedural   sedation/anesthesia.  The following day: return to full activity including work.  DIET:  Eat and drink normally unless instructed otherwise.     TREATMENT FOR COMMON SIDE EFFECTS:  - Mild abdominal pain, nausea, belching, bloating or excessive gas:  rest,   eat lightly and use a heating pad.  - Sore Throat: treat with throat lozenges and/or gargle with warm salt   water.  - Because air was used during the procedure, expelling large amounts of air   from your rectum or belching is normal.  - If a bowel prep was taken, you may not have a bowel movement for 1-3 days.    This is normal.  SYMPTOMS TO WATCH FOR AND REPORT TO YOUR PHYSICIAN:  1. Abdominal pain or bloating, other than gas cramps.  2. Chest pain.  3. Back pain.  4. Signs of infection such as: chills or fever occurring within 24 hours   after the procedure.  5. Rectal bleeding, which would show as bright red, maroon, or black stools.   (A tablespoon of blood from the rectum is not serious, especially if    hemorrhoids are present.)  6. Vomiting.  7. Weakness or dizziness.  GO DIRECTLY TO THE NEAREST EMERGENCY ROOM IF YOU HAVE ANY OF THE FOLLOWING:      Difficulty breathing              Chills and/or fever over 101 F   Persistent vomiting and/or vomiting blood   Severe abdominal pain   Severe chest pain   Black, tarry stools   Bleeding- more than one tablespoon   Any other symptom or condition that you feel may need urgent attention  Your doctor recommends these additional instructions:  If any biopsies were taken, your doctors clinic will contact you in 1 to 2   weeks with any results.  Recommendations:  - [facility].   - NPO.   - Continue present medications.   - Please follow the post-PEG recommendations including: external bolster   snug to abdominal wall, change dressing once per day, may use PEG today for   meds and water, may use PEG tomorrow for feedings and clean site with soap   and water daily and dry thoroughly.   - Ensure that patient has an abdominal binder in place at all times to   prevent accidental dislodgement of the tube. If this tube falls out, it may   be very difficult to find an alternative location for endoscopic PEG   placement due to multiple prior PEG placements.  Impressions:  - Benign-appearing esophageal stenosis.   - Closed previous gastrostomy present characterized by healthy appearing   mucosa.   - Erythematous duodenopathy.   - An externally removable PEG placement was successfully completed.   - No specimens collected.  For questions, problems or results please call your physician - Ayan Chaudhari MD at Work:  [phone].  [facility] ED at [phone]  IF A COMPLICATION OR EMERGENCY SITUATION ARISES AND YOU ARE UNABLE TO REACH   YOUR PHYSICIAN - GO DIRECTLY TO THE EMERGENCY ROOM.  MD Ayan Page MD  6/6/2025 1:40:22 PM  This report has been verified and signed electronically.  Dear patient,  As a  result of recent federal legislation (The Federal Cures Act), you may   receive lab or pathology results from your procedure in your [facility]   account before your physician is able to contact you. Your physician or   their representative will relay the results to you with their   recommendations at their soonest availability.  Thank you,  PROVATION

## 2025-06-06 NOTE — HOSPITAL COURSE
6/6/25-Patient had her PEG placed.  She is stable and appears to be ready for discharge back top NH.  Exam(awake, NAD, RRR, CTA, BS +, NTTP)    6/6/25-Transportation was not available but will be available in the am.

## 2025-06-06 NOTE — TRANSFER OF CARE
"Anesthesia Transfer of Care Note    Patient: Niles Alfonso    Procedure(s) Performed: Procedure(s) (LRB):  PEG (N/A)    Patient location: PACU    Anesthesia Type: MAC    Transport from OR: Transported from OR on room air with adequate spontaneous ventilation    Post pain: adequate analgesia    Post assessment: no apparent anesthetic complications and tolerated procedure well    Post vital signs: stable    Level of consciousness: responds to stimulation    Nausea/Vomiting: no nausea/vomiting    Complications: none    Transfer of care protocol was followed    Last vitals: Visit Vitals  BP 94/72   Pulse 86   Temp 36.4 °C (97.6 °F)   Resp 13   Ht 5' 6" (1.676 m)   Wt 59 kg (130 lb)   LMP  (LMP Unknown)   SpO2 (!) 94%   Breastfeeding No   BMI 20.98 kg/m²     "

## 2025-06-06 NOTE — ASSESSMENT & PLAN NOTE
Nutrition consulted. Most recent weight and BMI monitored-     Measurements:  Wt Readings from Last 1 Encounters:   06/06/25 59 kg (130 lb)   Body mass index is 20.98 kg/m².    Patient has been screened and assessed by RD.    Malnutrition Type:  Context: chronic illness  Level: severe    Malnutrition Characteristic Summary:  Weight Loss (Malnutrition): other (see comments) (Unable to assess)  Energy Intake (Malnutrition): other (see comments) (Unable to assess)  Subcutaneous Fat (Malnutrition): severe depletion  Muscle Mass (Malnutrition): severe depletion    Interventions/Recommendations (treatment strategy):  Enteral nutrition

## 2025-06-06 NOTE — PLAN OF CARE
GI Plan of Care    Plan was for PEG today but GI lab was unable to get in touch with patient's mother Sofie so case was cancelled. I will attempt to call again on Sunday with tentative plan for non emergent PEG placement Monday.    Naa Ochoa PA-C  LG    Addendum 6/6 at 1303  GI lab able to get in touch with patient's mother Sofie who again consents to procedure. Will proceed with endoscopic replacement of PEG tube today.

## 2025-06-06 NOTE — SUBJECTIVE & OBJECTIVE
Interval History:     Review of Systems   Unable to perform ROS: Patient nonverbal     Objective:     Vital Signs (Most Recent):  Temp: 98 °F (36.7 °C) (06/06/25 1618)  Pulse: 75 (06/06/25 1618)  Resp: 17 (06/06/25 1416)  BP: 108/66 (06/06/25 1618)  SpO2: (!) 93 % (06/06/25 1618) Vital Signs (24h Range):  Temp:  [96.6 °F (35.9 °C)-98 °F (36.7 °C)] 98 °F (36.7 °C)  Pulse:  [52-96] 75  Resp:  [13-18] 17  SpO2:  [93 %-100 %] 93 %  BP: ()/() 108/66     Weight: 59 kg (130 lb)  Body mass index is 20.98 kg/m².    Intake/Output Summary (Last 24 hours) at 6/6/2025 1853  Last data filed at 6/6/2025 1334  Gross per 24 hour   Intake 300 ml   Output --   Net 300 ml         Physical Exam  Constitutional:       General: She is awake.      Appearance: She is normal weight.   HENT:      Head: Atraumatic.      Nose: Nose normal.      Mouth/Throat:      Mouth: Mucous membranes are moist.      Pharynx: Oropharynx is clear.   Eyes:      Extraocular Movements: Extraocular movements intact.      Conjunctiva/sclera: Conjunctivae normal.      Pupils: Pupils are equal, round, and reactive to light.      Comments: Right eye is absent   Cardiovascular:      Rate and Rhythm: Normal rate and regular rhythm.   Abdominal:      General: Bowel sounds are normal.      Palpations: Abdomen is soft.      Comments: PEG   Musculoskeletal:      Cervical back: Normal range of motion and neck supple.      Comments: atrophy   Skin:     General: Skin is warm and dry.      Capillary Refill: Capillary refill takes 2 to 3 seconds.   Neurological:      Mental Status: Mental status is at baseline. She is disoriented.   Psychiatric:         Speech: She is noncommunicative.         Cognition and Memory: Cognition is impaired. Memory is impaired. She exhibits impaired recent memory and impaired remote memory.               Significant Labs: All pertinent labs within the past 24 hours have been reviewed.  CBC:   Recent Labs   Lab 06/05/25  1057   WBC 7.98  "  HGB 14.1   HCT 42.3        CMP:   Recent Labs   Lab 06/05/25  1057      K 4.0      CO2 26   GLU 84   BUN 15.1   CREATININE 0.63   CALCIUM 9.2   PROT 7.1   ALBUMIN 3.5   BILITOT 0.3   ALKPHOS 119   AST 17   ALT 24     Magnesium: No results for input(s): "MG" in the last 48 hours.    Significant Imaging: I have reviewed all pertinent imaging results/findings within the past 24 hours.  "

## 2025-06-06 NOTE — PROGRESS NOTES
Inpatient Nutrition Assessment    Admit Date: 6/5/2025   Total duration of encounter: 1 day   Patient Age: 44 y.o.    Nutrition Recommendation/Prescription     Once medically feasible, start EN. TF recs:  Isosource 1.5 (comparable to Jevity 1.5), bolus 240 mL 5x/day. This will provide:  1800 kcal (101% est needs)  82 gm protein (92% est needs)  912 mL free water (52% est needs)  FWF: 90 mL free water before and after each bolus    Monitor wt, labs, and EN tolerance.     Communication of Recommendations: reviewed with nurse, reviewed with patient, and EMR    Nutrition Assessment     Malnutrition Assessment/Nutrition-Focused Physical Exam    Malnutrition Context: chronic illness (06/06/25 1049)  Malnutrition Level: severe (06/06/25 1049)  Energy Intake (Malnutrition): other (see comments) (Unable to assess) (06/06/25 1049)  Weight Loss (Malnutrition): other (see comments) (Unable to assess) (06/06/25 1049)  Subcutaneous Fat (Malnutrition): severe depletion (06/06/25 1049)  Orbital Region (Subcutaneous Fat Loss): severe depletion        Muscle Mass (Malnutrition): severe depletion (06/06/25 1049)  Mandaen Region (Muscle Loss): severe depletion                                A minimum of two characteristics is recommended for diagnosis of either severe or non-severe malnutrition.    Chart Review    Reason Seen: malnutrition screening tool (MST)    Malnutrition Screening Tool Results   Have you recently lost weight without trying?: Unsure  Have you been eating poorly because of a decreased appetite?: No (johnathan)   MST Score: 2   Diagnosis:  PEG tube malfunction     Relevant Medical History:    Acquired absence of one eye    Anemia, unspecified    Cerebral palsy, unspecified    Constipation    Contracture of hand joint, left    Contracture of muscle, right hand    DM (diabetes mellitus)    Dry eye syndrome of lacrimal gland, unspecified laterality    HTN (hypertension)    Quadriplegia    TBI (traumatic brain injury)     "Unspecified protein-calorie malnutrition    Vitamin D deficiency, unspecified       Scheduled Medications:  levETIRAcetam (Keppra) IV (PEDS and ADULTS), 500 mg, Q12H  LIDOcaine (PF) 10 mg/ml (1%), ,   propofol, ,     Continuous Infusions:   PRN Medications:  LIDOcaine (PF) 10 mg/ml (1%), ,   propofol, ,     Calorie Containing IV Medications: no significant kcals from medications at this time    Recent Labs   Lab 06/05/25  1057      K 4.0   CALCIUM 9.2      CO2 26   BUN 15.1   CREATININE 0.63   EGFRNORACEVR >60   GLU 84   BILITOT 0.3   ALKPHOS 119   ALT 24   AST 17   ALBUMIN 3.5   WBC 7.98   HGB 14.1   HCT 42.3     Nutrition Orders:  Diet NPO      Appetite/Oral Intake: NPO/NPO  Factors Affecting Nutritional Intake: NPO  Social Needs Impacting Access to Food: unable to assess at this time; will attempt on follow-up  Food/Yazidism/Cultural Preferences: unable to obtain  Food Allergies: no known food allergies     Wound(s):  skin intact per EMR    Comments    6/6/25: Pt currently NPO with plans to have PEG tube replaced today. Called NH and pt normally receives 5 cans of Jevity 1.5 per day.     Anthropometrics    Height: 5' 6" (167.6 cm), Height Method: Estimated  Last Weight: 59 kg (130 lb) (06/06/25 0555), Weight Method: Bed Scale  BMI (Calculated): 21  BMI Classification: normal (BMI 18.5-24.9)     Ideal Body Weight (IBW), Female: 130 lb     % Ideal Body Weight, Female (lb): 100 %                             Usual Weight Provided By: unable to obtain usual weight    Wt Readings from Last 5 Encounters:   06/06/25 59 kg (130 lb)   05/18/24 63.5 kg (140 lb)     Weight Change(s) Since Admission:   Wt Readings from Last 1 Encounters:   06/06/25 0555 59 kg (130 lb)   06/05/25 0858 59 kg (130 lb)   Admit Weight: 59 kg (130 lb) (06/05/25 0858), Weight Method: Estimated    Estimated Needs    Weight Used For Calorie Calculations: 59 kg (130 lb 1.1 oz)  Energy Calorie Requirements (kcal): 1770 kcal (30 " kcal/kg)  Energy Need Method: Kcal/kg  Weight Used For Protein Calculations: 59 kg (130 lb 1.1 oz)  Protein Requirements: 89 gm (1.5g/kg)  Fluid Requirements (mL): 1770 mL  CHO Requirement: 199 gm (45% EEN)     Enteral Nutrition     Patient not receiving enteral nutrition at this time.    Parenteral Nutrition     Patient not receiving parenteral nutrition support at this time.    Evaluation of Received Nutrient Intake    Calories: not meeting estimated needs  Protein: not meeting estimated needs    Patient Education     Not applicable.    Nutrition Diagnosis     PES: Inadequate energy intake related to acute illness as evidenced by NPO with no nutrition support running since admit. (new)     PES: Severe chronic disease or condition related malnutrition Related to chronic illness As Evidenced by:  - weight loss: Unable to assess - muscle mass depletion: 1 area of severe muscle loss (Temporalis) - loss of subcutaneous fat: 1 area of severe fat loss (Infraorbital) new    Nutrition Interventions     Intervention(s): modified composition of enteral nutrition, modified rate of enteral nutrition, and collaboration with other providers  Intervention(s): Enteral nutrition    Goal: Meet greater than 80% of nutritional needs by follow-up. (new)  Goal: Maintain weight throughout hospitalization. (new)    Nutrition Goals & Monitoring     Dietitian will monitor: enteral nutrition intake and weight  Discharge planning: tube feeding (Isosource 1.5, bolus 240 mL 5x/day)  Nutrition Risk/Follow-Up: patient at increased nutrition risk; dietitian will follow-up twice weekly   Please consult if re-assessment needed sooner.

## 2025-06-06 NOTE — NURSING
I was advised by GI Lab that they are trying to reach this patients mother to give consent for PEG tube placement. I called [facility] to try to get an alternative number. I spoke with Lali from case management and she 1. Advised me the mother has been sick and 2. gave me the sisters number-Inés: [phone]. When GI lab attempted this number a male answered who didn't know nIés. I called back to [place] and spoke to the ADN this time. He 1. confirmed that I had taken Inés's numbers down correctly and 2. gave me the only other contact for this patient- Anny: home [phone] and mobile [phone]. Both of these numbers were disconnected.

## 2025-06-06 NOTE — DISCHARGE SUMMARY
[facility] Medicine  Discharge Summary      Patient Name: Niles Alfonso  MRN: [id number]  ASHISH: [id number]  Patient Class: OP- Observation  Admission Date: 6/5/2025  Hospital Length of Stay: 0 days  Discharge Date and Time: 06/06/2025 4:29 PM  Attending Physician: Stevie Gu MD   Discharging Provider: Stevie Gu MD  Primary Care Provider: Narda, Primary Doctor    Primary Care Team: Networked reference to record PCT     HPI:   43 yo female presents from a local NH due to dislodgement of her PEG.  This has occurred in the past as well.  The ED was not able to replace the tube in the ED so she is being admitted to observation for GI to replace.  GI is not able to do it today due to OR availability.  Patient is nonverbal and I am unable to get any information from the patient .  The mother is not at bedside.  GI did speak to mother over the phone and they will proceed with EGD and PEG placement tomorrow.  Currently no N/V/D/C.  No fever/chills.      Procedure(s) (LRB):  PEG (N/A)      Hospital Course:   6/6/25-Patient had her PEG placed.  She is stable and appears to be ready for discharge back top NH.  Exam(awake, NAD, RRR, CTA, BS +, NTTP)     Goals of Care Treatment Preferences:         SDOH Screening:  The patient was unable to be screened for utility difficulties, food insecurity, transport difficulties, housing insecurity, and interpersonal safety, so no concerns could be identified this admission.     Consults:   Consults (From admission, onward)          Status Ordering Provider     Inpatient consult to Gastroenterology  Once        Provider:  Ayan Chaudhari MD    Completed YINKA WILLOUGHBY            Assessment & Plan  PEG tube malfunction  Admit to obs  GI consulted for replacement of PEG    Seizure  Will switch keppra to IV for now    Severe malnutrition  Nutrition consulted. Most recent weight and BMI monitored-     Measurements:  Wt  Readings from Last 1 Encounters:   06/06/25 59 kg (130 lb)   Body mass index is 20.98 kg/m².    Patient has been screened and assessed by RD.    Malnutrition Type:  Context: chronic illness  Level: severe    Malnutrition Characteristic Summary:  Weight Loss (Malnutrition): other (see comments) (Unable to assess)  Energy Intake (Malnutrition): other (see comments) (Unable to assess)  Subcutaneous Fat (Malnutrition): severe depletion  Muscle Mass (Malnutrition): severe depletion    Interventions/Recommendations (treatment strategy):  Enteral nutrition    Final Active Diagnoses:    Diagnosis Date Noted POA    PRINCIPAL PROBLEM:  PEG tube malfunction [K94.23] 06/05/2025 Yes    Severe malnutrition [E43] 06/06/2025 Yes    Seizure [R56.9] 06/05/2025 Yes      Problems Resolved During this Admission:       Discharged Condition: stable    Disposition: alf Nursing Facili*    Follow Up:   Follow-up Information       No, Primary Doctor Follow up in 1 week(s).                           Patient Instructions:   No discharge procedures on file.    Significant Diagnostic Studies: Labs: BMP:   Recent Labs   Lab 06/05/25  1057   GLU 84      K 4.0      CO2 26   BUN 15.1   CREATININE 0.63   CALCIUM 9.2   , CMP   Recent Labs   Lab 06/05/25  1057      K 4.0      CO2 26   GLU 84   BUN 15.1   CREATININE 0.63   CALCIUM 9.2   PROT 7.1   ALBUMIN 3.5   BILITOT 0.3   ALKPHOS 119   AST 17   ALT 24   , and CBC   Recent Labs   Lab 06/05/25  1057   WBC 7.98   HGB 14.1   HCT 42.3          Pending Diagnostic Studies:       None           Medications:  Reconciled Home Medications:      Medication List        CONTINUE taking these medications      acetaminophen 325 MG tablet  Commonly known as: TYLENOL  Take 325 mg by mouth every 6 (six) hours as needed for Pain.     aspirin 81 MG Chew  Take 81 mg by mouth once daily.     baclofen 20 MG tablet  Commonly known as: LIORESAL  Take 20 mg by mouth 3 (three) times daily.      docusate 50 mg/5 mL liquid  Commonly known as: COLACE  Take 100 mg by mouth 2 (two) times daily.     levETIRAcetam 100 mg/mL Soln  Commonly known as: KEPPRA  Take 20 mg/kg by mouth 2 (two) times daily.     polyethylene glycol 17 gram/dose powder  Commonly known as: GLYCOLAX  Take 17 g by mouth once daily.              Indwelling Lines/Drains at time of discharge:   Lines/Drains/Airways       None                   Time spent on the discharge of patient: 38 minutes        Patient screened for food insecurity, housing instability, transportation needs, utility difficulties, and interpersonal safety.   No needs, will return to NH    Stevie Gu MD  Department of Hospital Medicine  [facility] - 8th Floor Med Surg

## 2025-06-06 NOTE — PLAN OF CARE
[facility] sent clinical updates to [facility] via [facility] as patient is resident.

## 2025-06-06 NOTE — ANESTHESIA PREPROCEDURE EVALUATION
06/06/2025  Niles Alfonso is a 44 y.o., female with   -------------------------------------    Acquired absence of one eye    Anemia, unspecified    Cerebral palsy, unspecified    Constipation    Contracture of hand joint, left    Contracture of muscle, right hand    DM (diabetes mellitus)    Dry eye syndrome of lacrimal gland, unspecified laterality    HTN (hypertension)    Quadriplegia    TBI (traumatic brain injury)    Unspecified protein-calorie malnutrition    Vitamin D deficiency, unspecified       And   ----------------------------    Gastrostomy tube placement       Presents for PEG due to dislodged PEG tube     Principal Problem:PEG tube malfunction     Chief Complaint:        Chief Complaint   Patient presents with    PEG Tube Problem       Sent from [facility]. NH reporting pt pulled PEG out around 0600 this morning.          HPI: 45 yo female presents from a local NH due to dislodgement of her PEG.  This has occurred in the past as well.  The ED was not able to replace the tube in the ED so she is being admitted to observation for GI to replace.  GI is not able to do it today due to OR availability.  Patient is nonverbal and I am unable to get any information from the patient .  The mother is not at bedside.  GI did speak to mother over the phone and they will proceed with EGD and PEG placement tomorrow.  Currently no N/V/D/C.  No fever/chills.       Pre-op Assessment    I have reviewed the NPO Status.      Review of Systems  Cardiovascular:     Hypertension                                    Hypertension         Neurological:    Neuromuscular Disease,   Seizures           Seizure Disorder                        Neuromuscular Disease   Endocrine:  Diabetes    Diabetes                         Latest Reference Range & Units 06/05/25 10:57 06/06/25 04:10   WBC 4.50 - 11.50  x10(3)/mcL 7.98    Hemoglobin 12.0 - 16.0 g/dL 14.1    Hematocrit 37.0 - 47.0 % 42.3    Platelet Count 130 - 400 x10(3)/mcL 371    PT 12.5 - 14.5 seconds  12.7   INR <=1.3   0.9   Sodium 136 - 145 mmol/L 140    Potassium 3.5 - 5.1 mmol/L 4.0    Chloride 98 - 107 mmol/L 107    CO2 22 - 29 mmol/L 26    Anion Gap mEq/L 7.0    BUN 7.0 - 18.7 mg/dL 15.1    Creatinine 0.55 - 1.02 mg/dL 0.63    BUN/CREAT RATIO  24    eGFR mL/min/1.73/m2 >60    Glucose 74 - 100 mg/dL 84           Anesthesia Plan  Type of Anesthesia, risks & benefits discussed:    Anesthesia Type: Gen Natural Airway  Intra-op Monitoring Plan: Standard ASA Monitors  Post Op Pain Control Plan: IV/PO Opioids PRN  Induction:  IV  Airway Plan: Direct  Informed Consent: Informed consent signed with the Patient and all parties understand the risks and agree with anesthesia plan.  All questions answered. Patient consented to blood products? No  ASA Score: 3  Day of Surgery Review of History & Physical: H&P Update referred to the surgeon/provider.  Anesthesia Plan Notes: Nasal cannula vs facemask supplemental oxygenation   For patients with GILMA/obesity, may consider SuperNoval Nasal CPAP      Ready For Surgery From Anesthesia Perspective.     .

## 2025-06-06 NOTE — PLAN OF CARE
ZORAN Ricci completed with mother, Sofie, over the phone. She is receiving an infusion and daughter is unable to sign. She requests that the letter just go in the chart. No delivery will be made.

## 2025-06-07 PROCEDURE — 96366 THER/PROPH/DIAG IV INF ADDON: CPT

## 2025-06-07 PROCEDURE — 63600175 PHARM REV CODE 636 W HCPCS: Performed by: STUDENT IN AN ORGANIZED HEALTH CARE EDUCATION/TRAINING PROGRAM

## 2025-06-07 PROCEDURE — G0378 HOSPITAL OBSERVATION PER HR: HCPCS

## 2025-06-07 PROCEDURE — 25000003 PHARM REV CODE 250: Performed by: STUDENT IN AN ORGANIZED HEALTH CARE EDUCATION/TRAINING PROGRAM

## 2025-06-07 RX ADMIN — LEVETIRACETAM 500 MG: 100 INJECTION, SOLUTION INTRAVENOUS at 09:06

## 2025-06-07 NOTE — PLAN OF CARE
[facility] sent discharge orders/AVS and clinicals to [facility] via [employer]. Patient placed in will-call with [facility]. Report packet given to nurse.

## 2025-06-07 NOTE — DISCHARGE SUMMARY
[facility] Medicine Discharge Summary    Admit Date: 6/5/2025  Discharge Date and Time: 6/7/20259:31 AM  Admitting Physician: RADHA Team  Discharging Physician: Starla Rico MD.  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 This has occurred in the past as well. The ED was not able to replace the tube in the ED so she is being admitted to observation for GI to replace. GI is not able to do it today due to OR availability. Patient is nonverbal and I am unable to get any information from the patient . The mother is not at bedside. GI did speak to mother over the phone and they will proceed with EGD and PEG placement tomorrow. Currently no N/V/D/C. No fever/chills.     PEG tube placed 6/6. Patient is tolerating feeds with minimal residual. Patient has been cleared to be d/c'ed back to facility.     Pt was seen and examined on the day of discharge  Vitals:  VITAL SIGNS: 24 HRS MIN & MAX LAST   Temp  Min: 97.5 °F (36.4 °C)  Max: 99 °F (37.2 °C) 98.4 °F (36.9 °C)   BP  Min: 94/69  Max: 128/101 106/64   Pulse  Min: 53  Max: 96  71   Resp  Min: 13  Max: 17 16   SpO2  Min: 93 %  Max: 99 % (!) 94 %       Physical Exam:  Skin:     General: Skin is warm and dry.   Neurological:      Mental Status: Mental status is at baseline. She is disoriented.   Psychiatric:         Speech: She is noncommunicative.         Cognition and Memory: Cognition is impaired. Memory is impaired. She exhibits impaired recent memory and impaired remote memory.   HENT:      Head: Normocephalic and atraumatic.      Nose: Nose normal.      Mouth/Throat:      Mouth: Mucous membranes are dry.      Pharynx: Oropharynx is clear.    Eyes:      Extraocular Movements: Extraocular movements intact.      Comments: Right eye is absent   Cardiovascular:      Rate and Rhythm: Normal rate and regular rhythm.      Pulses: Normal pulses.      Heart sounds: Normal heart sounds.     Procedures Performed: No admission procedures for hospital encounter.     Significant Diagnostic Studies: See Full reports for all details    Recent Labs   Lab 06/05/25  1057   WBC 7.98   RBC 4.37   HGB 14.1   HCT 42.3   MCV 96.8*   MCH 32.3*   MCHC 33.3   RDW 12.6      MPV 10.6*       Recent Labs   Lab 06/05/25  1057      K 4.0      CO2 26   BUN 15.1   CREATININE 0.63   GLU 84   CALCIUM 9.2   ALBUMIN 3.5   PROT 7.1   ALKPHOS 119   ALT 24   AST 17   BILITOT 0.3        Microbiology Results (last 7 days)       ** No results found for the last 168 hours. **             XR Gastric tube check, non-radiologist performed  Narrative: EXAMINATION:  XR GASTRIC TUBE CHECK, NON-RADIOLOGIST PERFORMED    CLINICAL HISTORY:  PEG replacement;    COMPARISON:  X-rays dated 10/04/2024    FINDINGS:  Contrast injected through the gastrostomy tube opacifies the stomach without evidence of extravasation  Impression: No evidence of extravasation    Electronically signed by: Bailey Hua  Date:    01/02/2025  Time:    12:06         Medication List        CONTINUE taking these medications      acetaminophen 325 MG tablet  Commonly known as: TYLENOL     aspirin 81 MG Chew     baclofen 20 MG tablet  Commonly known as: LIORESAL     docusate 50 mg/5 mL liquid  Commonly known as: COLACE     levETIRAcetam 100 mg/mL Soln  Commonly known as: KEPPRA     polyethylene glycol 17 gram/dose powder  Commonly known as: GLYCOLAX               Explained in detail to the patient about the discharge plan, medications, and follow-up visits. Pt understands and agrees with the treatment plan  Discharge Disposition: Skilled Nursing Facility   Discharged Condition: stable  Diet-   Dietary Orders (From  admission, onward)       Start     Ordered    06/06/25 1600  Tube Feedings/Formulas 240; 1,200; Isosource 1.5; Peg; 90; Other (see comments)  (Tube Feedings/Formulas Order Panel)  5 times daily      Comments: Isosource 1.5, bolus 240 mL 5x/day  FWF: 90 mL before and after each bolus   Question Answer Comment   Formula Rate (mL/hr): 240    Feeding Volume (mL): 1200    Select Formula: Isosource 1.5    Route: Peg    Free water flush volume (mL): 90    Free water flush frequency: Other (see comments)        06/06/25 1407    06/06/25 0001  Diet NPO  Diet effective midnight         06/05/25 1219                   Medications Per DC med rec  Activities as tolerated   Follow-up Information       No, Primary Doctor Follow up in 1 week(s).                           For further questions contact hospitalist office    Discharge time 33 minutes    For worsening symptoms, chest pain, shortness of breath, increased abdominal pain, high grade fever, stroke or stroke like symptoms, immediately go to the nearest Emergency Room or call 911 as soon as possible.      Starla Rasmussen M.D on 6/7/2025. at 9:31 AM.

## 2025-06-09 VITALS
DIASTOLIC BLOOD PRESSURE: 72 MMHG | BODY MASS INDEX: 20.89 KG/M2 | HEIGHT: 66 IN | HEART RATE: 79 BPM | SYSTOLIC BLOOD PRESSURE: 98 MMHG | WEIGHT: 130 LBS | RESPIRATION RATE: 16 BRPM | TEMPERATURE: 98 F | OXYGEN SATURATION: 96 %

## 2025-06-09 NOTE — ANESTHESIA POSTPROCEDURE EVALUATION
Anesthesia Post Evaluation    Patient: Niles Alfonso    Procedure(s) Performed: Procedure(s) (LRB):  PEG (N/A)    Final Anesthesia Type: general      Patient location during evaluation: PACU  Patient participation: No - Unable to Participate, Coma/Other Inability to Communicate  Level of consciousness: awake  Post-procedure vital signs: reviewed and stable  Pain management: adequate  Airway patency: patent    PONV status at discharge: No PONV  Anesthetic complications: no      Cardiovascular status: hemodynamically stable  Respiratory status: unassisted  Hydration status: euvolemic  Follow-up not needed.              Vitals Value Taken Time   /70 06/06/25 14:16   Temp 36.4 °C (97.6 °F) 06/06/25 13:39   Pulse 69 06/06/25 14:19   Resp 14 06/06/25 14:19   SpO2 97 % 06/06/25 14:19   Vitals shown include unfiled device data.      No case tracking events are documented in the log.      Pain/Torrey Score: No data recorded

## 2025-08-13 ENCOUNTER — LAB REQUISITION (OUTPATIENT)
Dept: LAB | Facility: HOSPITAL | Age: 45
End: 2025-08-13
Payer: MEDICARE

## 2025-08-13 DIAGNOSIS — I10 ESSENTIAL (PRIMARY) HYPERTENSION: ICD-10-CM

## 2025-08-13 DIAGNOSIS — E11.9 TYPE 2 DIABETES MELLITUS WITHOUT COMPLICATIONS: ICD-10-CM

## 2025-08-13 LAB
ALBUMIN SERPL-MCNC: 3.4 G/DL (ref 3.5–5)
ALBUMIN/GLOB SERPL: 1.1 RATIO (ref 1.1–2)
ALP SERPL-CCNC: 118 UNIT/L (ref 40–150)
ALT SERPL-CCNC: 20 UNIT/L (ref 0–55)
ANION GAP SERPL CALC-SCNC: 8 MEQ/L
AST SERPL-CCNC: 16 UNIT/L (ref 11–45)
BASOPHILS # BLD AUTO: 0.09 X10(3)/MCL
BASOPHILS NFR BLD AUTO: 1 %
BILIRUB SERPL-MCNC: 0.4 MG/DL
BUN SERPL-MCNC: 15.7 MG/DL (ref 7–18.7)
CALCIUM SERPL-MCNC: 9 MG/DL (ref 8.4–10.2)
CHLORIDE SERPL-SCNC: 111 MMOL/L (ref 98–107)
CHOLEST SERPL-MCNC: 167 MG/DL
CHOLEST/HDLC SERPL: 3 {RATIO} (ref 0–5)
CO2 SERPL-SCNC: 25 MMOL/L (ref 22–29)
CREAT SERPL-MCNC: 0.62 MG/DL (ref 0.55–1.02)
CREAT/UREA NIT SERPL: 25
EOSINOPHIL # BLD AUTO: 0.17 X10(3)/MCL (ref 0–0.9)
EOSINOPHIL NFR BLD AUTO: 1.8 %
ERYTHROCYTE [DISTWIDTH] IN BLOOD BY AUTOMATED COUNT: 13.2 % (ref 11.5–17)
EST. AVERAGE GLUCOSE BLD GHB EST-MCNC: 99.7 MG/DL
GFR SERPLBLD CREATININE-BSD FMLA CKD-EPI: >60 ML/MIN/1.73/M2
GLOBULIN SER-MCNC: 3.2 GM/DL (ref 2.4–3.5)
GLUCOSE SERPL-MCNC: 80 MG/DL (ref 74–100)
HBA1C MFR BLD: 5.1 %
HCT VFR BLD AUTO: 40.5 % (ref 37–47)
HDLC SERPL-MCNC: 50 MG/DL (ref 35–60)
HGB BLD-MCNC: 13.2 G/DL (ref 12–16)
IMM GRANULOCYTES # BLD AUTO: 0.02 X10(3)/MCL (ref 0–0.04)
IMM GRANULOCYTES NFR BLD AUTO: 0.2 %
LDLC SERPL CALC-MCNC: 106 MG/DL (ref 50–140)
LYMPHOCYTES # BLD AUTO: 4.11 X10(3)/MCL (ref 0.6–4.6)
LYMPHOCYTES NFR BLD AUTO: 43.8 %
MCH RBC QN AUTO: 31.4 PG (ref 27–31)
MCHC RBC AUTO-ENTMCNC: 32.6 G/DL (ref 33–36)
MCV RBC AUTO: 96.4 FL (ref 80–94)
MONOCYTES # BLD AUTO: 0.87 X10(3)/MCL (ref 0.1–1.3)
MONOCYTES NFR BLD AUTO: 9.3 %
NEUTROPHILS # BLD AUTO: 4.13 X10(3)/MCL (ref 2.1–9.2)
NEUTROPHILS NFR BLD AUTO: 43.9 %
NRBC BLD AUTO-RTO: 0 %
PLATELET # BLD AUTO: 337 X10(3)/MCL (ref 130–400)
PMV BLD AUTO: 11.1 FL (ref 7.4–10.4)
POTASSIUM SERPL-SCNC: 4.2 MMOL/L (ref 3.5–5.1)
PROT SERPL-MCNC: 6.6 GM/DL (ref 6.4–8.3)
RBC # BLD AUTO: 4.2 X10(6)/MCL (ref 4.2–5.4)
SODIUM SERPL-SCNC: 144 MMOL/L (ref 136–145)
TRIGL SERPL-MCNC: 57 MG/DL (ref 37–140)
VLDLC SERPL CALC-MCNC: 11 MG/DL
WBC # BLD AUTO: 9.39 X10(3)/MCL (ref 4.5–11.5)

## 2025-08-13 PROCEDURE — 85025 COMPLETE CBC W/AUTO DIFF WBC: CPT | Performed by: INTERNAL MEDICINE

## 2025-08-13 PROCEDURE — 80061 LIPID PANEL: CPT | Performed by: INTERNAL MEDICINE

## 2025-08-13 PROCEDURE — 83036 HEMOGLOBIN GLYCOSYLATED A1C: CPT | Performed by: INTERNAL MEDICINE

## 2025-08-13 PROCEDURE — 80053 COMPREHEN METABOLIC PANEL: CPT | Performed by: INTERNAL MEDICINE

## (undated) DEVICE — SOL IRRI STRL WATER 1000ML

## (undated) DEVICE — KIT CANIST SUCTION 1200CC

## (undated) DEVICE — COLLECTION SPECIMEN NEPTUNE

## (undated) DEVICE — KIT SURGICAL COLON .25 1.1OZ

## (undated) DEVICE — TUBING O2 FEMALE CONN 13FT

## (undated) DEVICE — TIP SUCTION YANKAUER